# Patient Record
Sex: MALE | Race: WHITE | NOT HISPANIC OR LATINO | Employment: FULL TIME | URBAN - METROPOLITAN AREA
[De-identification: names, ages, dates, MRNs, and addresses within clinical notes are randomized per-mention and may not be internally consistent; named-entity substitution may affect disease eponyms.]

---

## 2017-05-31 ENCOUNTER — HOSPITAL ENCOUNTER (OUTPATIENT)
Dept: RADIOLOGY | Facility: CLINIC | Age: 62
Discharge: HOME | End: 2017-05-31
Payer: COMMERCIAL

## 2017-05-31 ENCOUNTER — ALLSCRIPTS OFFICE VISIT (OUTPATIENT)
Dept: OTHER | Facility: OTHER | Age: 62
End: 2017-05-31

## 2017-05-31 DIAGNOSIS — M25.569 PAIN IN KNEE: ICD-10-CM

## 2017-05-31 PROCEDURE — 73564 X-RAY EXAM KNEE 4 OR MORE: CPT

## 2017-10-08 ENCOUNTER — OFFICE VISIT (OUTPATIENT)
Dept: URGENT CARE | Facility: CLINIC | Age: 62
End: 2017-10-08
Payer: COMMERCIAL

## 2017-10-08 DIAGNOSIS — B37.42 CANDIDAL BALANITIS: ICD-10-CM

## 2017-10-08 PROCEDURE — 99203 OFFICE O/P NEW LOW 30 MIN: CPT

## 2017-10-09 ENCOUNTER — APPOINTMENT (OUTPATIENT)
Dept: LAB | Facility: HOSPITAL | Age: 62
End: 2017-10-09
Attending: FAMILY MEDICINE
Payer: COMMERCIAL

## 2017-10-09 DIAGNOSIS — B37.42 CANDIDAL BALANITIS: ICD-10-CM

## 2017-10-09 PROCEDURE — 87077 CULTURE AEROBIC IDENTIFY: CPT

## 2017-10-09 PROCEDURE — 87086 URINE CULTURE/COLONY COUNT: CPT

## 2017-10-09 PROCEDURE — 87147 CULTURE TYPE IMMUNOLOGIC: CPT

## 2017-10-09 NOTE — PROGRESS NOTES
Assessment  1  Candidal balanitis (112 2) (B37 42)   2  Microscopic hematuria (599 72) (R31 29)    Plan  Candidal balanitis    · Nystatin-Triamcinolone 085582-3 1 UNIT/GM-% External Cream; APPLY  SPARINGLY TO AFFECTED AREA(S) 3 TIMES A DAY   · (1) URINE CULTURE; Source:Urine, Clean Catch; Status:Active - Retrospective By  Protocol Authorization; Requested for:08Oct2017;    · Urine Dip Non-Automated- POC; Status:Resulted - Requires Verification,Retrospective  By Protocol Authorization;   Done: 17KLH9367 08:46AM  Microscopic hematuria    · Ciprofloxacin HCl - 500 MG Oral Tablet; TAKE 1 TABLET TWICE DAILY    Discussion/Summary  Discussion Summary:   F/U PCP 1-2 days  Medication Side Effects Reviewed: Possible side effects of new medications were reviewed with the patient/guardian today  Understands and agrees with treatment plan: The treatment plan was reviewed with the patient/guardian  The patient/guardian understands and agrees with the treatment plan      Chief Complaint  Chief Complaint Free Text Note Form: Wife states turtle who can't get it's head out of the shell for a week or two  Wife states urination issues for a few months  Shaft is swollen  The area is not itchy and minimum pain  Decreased appetite  Chills and fever within last 2 weeks  Achy  Did not take any medication last night or this morning  History of Present Illness  HPI: Pt presents with swelling around the shaft of the penis for 1-2 weeks  He denies dysuria, urgency or frequency  Temp today of 100 7, he has not taken his temp prior to today  Per his wife he has had shaking chills over the past 2 days, he feels fatigued  No abdominal or low back pain  No flank pain  Urinary stream seem slower and weaker, no hx of BPH  [facility] Based Practices Required Assessment:    Prefered Language is  english  Primary Language is  english  Review of Systems  Focused-Male:   Constitutional: as noted in HPI     Cardiovascular: no complaints of slow or fast heart rate, no chest pain, no palpitations, no leg claudication or lower extremity edema  Respiratory: no complaints of shortness of breath, no wheezing or cough, no dyspnea on exertion, no orthopnea or PND  Gastrointestinal: no complaints of abdominal pain, no constipation, no nausea or vomiting, no diarrhea or bloody stools  Genitourinary: as noted in HPI  Musculoskeletal: no complaints of arthralgia, no myalgia, no joint swelling or stiffness, no limb pain or swelling  Integumentary: no complaints of skin rash or lesion, no itching or dry skin, no skin wounds  Active Problems  1  Diabetes mellitus (250 00) (E11 9)   2  Hypertension (401 9) (I10)   3  Knee pain (719 46) (M25 569)   4  Patellar tendonitis of right knee (726 64) (M76 51)   5  Primary osteoarthritis of right knee (715 16) (M17 11)    Past Medical History  1  History of Atrial fibrillation (427 31) (I48 91)   2  History of gout (V12 29) (Z87 39)   3  History of renal calculi (V13 01) (Z87 442)   4  History of Reflux (530 81) (K21 9)    Family History  Mother    1  Family history of Colon cancer   2  Family history of Uterine cancer  Father    3  Family history of Prostate cancer  Brother    4  Family history of Lung cancer    Social History   · Former smoker (X87 53) (B92 511)   · Occasional alcohol use    Surgical History  1  History of Surgery Spermatic Cord For Varicocele    Current Meds   1  Allopurinol 100 MG Oral Tablet; TAKE 1 TABLET TWICE DAILY; Therapy: 58BSO5779 to Recorded   2  Avapro 300 MG Oral Tablet; TAKE 1 TABLET DAILY; Therapy: (Recorded:02Oct2014) to Recorded   3  B Complex TABS; Therapy: [phone]) to Recorded   4  Cinnamon TABS; Therapy: [phone]) to Recorded   5  Crestor 20 MG Oral Tablet; TAKE 1 TABLET DAILY; Therapy: 79LDQ9508 to Recorded   6  Daily Multivitamin TABS; Therapy: [phone]) to Recorded   7  Docusate Sodium 100 MG Oral Capsule;    Therapy: (SXCFQWYV:69AVO4233) to Recorded   8  Fish Oil 1200 MG Oral Capsule; Take 1 capsule twice daily; Therapy: (29-29-69-33) to Recorded   9  Gabapentin 300 MG Oral Capsule; TAKE 1 CAPSULE AT BEDTIME; Therapy: 25Jla2484 to Recorded   10  Glimepiride 2 MG Oral Tablet; Take 1 tablet twice daily; Therapy: 87MPO7212 to Recorded   11  Glucosamine Sulfate 1000 MG Oral Capsule; Therapy: (Recorded:02Oct2014) to Recorded   12  Januvia 100 MG Oral Tablet; TAKE 1 TABLET DAILY; Therapy: 92Ejq7029 to Recorded   13  Lasix 40 MG Oral Tablet; TAKE 1 TABLET DAILY; Therapy: 22Ifx3075 to Recorded   14  MetFORMIN HCl - 1000 MG Oral Tablet; TAKE 1 TABLET TWICE DAILY; Therapy: 06Bal4154 to Recorded   15  Niaspan 500 MG Oral Tablet Extended Release; TAKE 1 TABLET AT BEDTIME; Therapy: 69UHQ7753 to Recorded   16  Prevacid 30 MG Oral Capsule Delayed Release; TAKE 1 CAPSULE DAILY; Therapy: 64Pyn7458 to Recorded   17  Toprol  MG Oral Tablet Extended Release 24 Hour; Take 1 tablet twice daily; Therapy: 95DEG6115 to (Evaluate:05Oct2014) Recorded   18  Vitamin B12 TABS; Therapy: (29-29-69-33) to Recorded   19  Vitamin D3 2000 UNIT Oral Tablet; Take 1 tablet twice daily; Therapy: (Recorded:02Oct2014) to Recorded    Allergies  1  Contrast Media Ready-Box MISC    Vitals  Signs   Recorded: 26XFU4653 14:77CW   Systolic: 184  Diastolic: 75  Recorded: 43FJF1395 08:21AM   Temperature: 100 7 F  Heart Rate: 77  Respiration: 16  Height: 5 ft 10 in  Weight: 339 lb   BMI Calculated: 48 64  BSA Calculated: 2 61  O2 Saturation: 95    Physical Exam    Constitutional   General appearance: No acute distress, well appearing and well nourished  Abdomen   Abdomen: Non-tender, no masses  Liver and spleen: No hepatomegaly or splenomegaly  Additional Exam:  : glans erythematous, swelling of skin surrounding shaft of penis, malodorous when skin retracted past glans  circumcised        Results/Data  Urine Dip Non-Automated- POC 30HYH5353 08:46AM Sydnee Noun     Test Name Result Flag Reference   Color Yellow     Clarity Hazy     Leukocytes neg     Nitrite neg     Blood trace     Bilirubin neg     Urobilinogen 0 2     Protein 300+     Ph 5 0     Specific Gravity 1 025     Ketone trace     Glucose neg     Color Yellow     Clarity Hazy     Leukocytes neg     Nitrite neg     Blood trace     Bilirubin neg     Urobilinogen 0 2     Protein 300+     Ph 5 0     Specific Gravity 1 025     Ketone trace     Glucose neg               Message  Return to work or school:   Frida Sutton is under my professional care  He was seen in my office on 10/8/2017        Darwyn Mcardle, D O        Signatures   Electronically signed by : Darwyn Mcardle, DO; Oct  8 2017  8:55AM EST                       (Author)

## 2017-10-11 LAB — BACTERIA UR CULT: NORMAL

## 2018-01-12 VITALS
BODY MASS INDEX: 45.1 KG/M2 | SYSTOLIC BLOOD PRESSURE: 151 MMHG | HEIGHT: 70 IN | WEIGHT: 315 LBS | DIASTOLIC BLOOD PRESSURE: 78 MMHG | HEART RATE: 69 BPM

## 2018-01-18 NOTE — MISCELLANEOUS
Message  Return to work or school:   Tara Cabrera is under my professional care  He was seen in my office on 10/8/2017        SHIRLEY Green        Signatures   Electronically signed by : Leanne Angeles DO; Oct  8 2017  8:55AM EST                       (Author)

## 2018-03-09 ENCOUNTER — TRANSCRIBE ORDERS (OUTPATIENT)
Dept: ADMINISTRATIVE | Facility: HOSPITAL | Age: 63
End: 2018-03-09

## 2018-03-09 DIAGNOSIS — M75.102 ROTATOR CUFF SYNDROME OF LEFT SHOULDER: Primary | ICD-10-CM

## 2018-03-16 ENCOUNTER — HOSPITAL ENCOUNTER (OUTPATIENT)
Dept: MRI IMAGING | Facility: HOSPITAL | Age: 63
Discharge: HOME | End: 2018-03-16
Payer: COMMERCIAL

## 2018-03-16 ENCOUNTER — HOSPITAL ENCOUNTER (OUTPATIENT)
Dept: RADIOLOGY | Facility: HOSPITAL | Age: 63
Discharge: HOME | End: 2018-03-16
Payer: COMMERCIAL

## 2018-03-16 DIAGNOSIS — M75.102 ROTATOR CUFF SYNDROME OF LEFT SHOULDER: ICD-10-CM

## 2018-03-16 PROCEDURE — 73221 MRI JOINT UPR EXTREM W/O DYE: CPT

## 2018-03-16 PROCEDURE — 73030 X-RAY EXAM OF SHOULDER: CPT

## 2018-03-29 ENCOUNTER — OFFICE VISIT (OUTPATIENT)
Dept: OBGYN CLINIC | Facility: CLINIC | Age: 63
End: 2018-03-29
Payer: COMMERCIAL

## 2018-03-29 VITALS — HEART RATE: 87 BPM | HEIGHT: 69 IN | DIASTOLIC BLOOD PRESSURE: 75 MMHG | SYSTOLIC BLOOD PRESSURE: 130 MMHG

## 2018-03-29 DIAGNOSIS — M67.912 TENDINOPATHY OF ROTATOR CUFF, LEFT: Primary | ICD-10-CM

## 2018-03-29 PROCEDURE — 99213 OFFICE O/P EST LOW 20 MIN: CPT | Performed by: ORTHOPAEDIC SURGERY

## 2018-03-29 RX ORDER — IRBESARTAN 300 MG/1
1 TABLET ORAL DAILY
COMMUNITY
End: 2020-11-02

## 2018-03-29 RX ORDER — GABAPENTIN 300 MG/1
CAPSULE ORAL
COMMUNITY
Start: 2018-02-19 | End: 2019-02-18 | Stop reason: SDUPTHER

## 2018-03-29 RX ORDER — CHLORAL HYDRATE 500 MG
1 CAPSULE ORAL 2 TIMES DAILY
COMMUNITY
End: 2020-11-01

## 2018-03-29 RX ORDER — UBIDECARENONE 75 MG
CAPSULE ORAL
COMMUNITY

## 2018-03-29 RX ORDER — HYDROCHLOROTHIAZIDE 25 MG/1
TABLET ORAL
COMMUNITY
Start: 2018-02-23 | End: 2021-08-11 | Stop reason: ALTCHOICE

## 2018-03-29 RX ORDER — METOPROLOL SUCCINATE 100 MG/1
1 TABLET, EXTENDED RELEASE ORAL 2 TIMES DAILY
COMMUNITY
Start: 2014-01-03 | End: 2018-11-26 | Stop reason: SDUPTHER

## 2018-03-29 RX ORDER — ROSUVASTATIN CALCIUM 20 MG/1
1 TABLET, COATED ORAL DAILY
COMMUNITY
Start: 2014-05-19 | End: 2019-02-21 | Stop reason: SDUPTHER

## 2018-03-29 RX ORDER — GLIMEPIRIDE 4 MG/1
TABLET ORAL
COMMUNITY
Start: 2018-02-19 | End: 2019-02-18 | Stop reason: SDUPTHER

## 2018-03-29 RX ORDER — POTASSIUM CHLORIDE 20 MEQ/1
TABLET, EXTENDED RELEASE ORAL
COMMUNITY
Start: 2018-02-19 | End: 2018-11-26 | Stop reason: SDUPTHER

## 2018-03-29 RX ORDER — ALLOPURINOL 100 MG/1
TABLET ORAL
COMMUNITY
Start: 2018-02-19 | End: 2018-11-26 | Stop reason: SDUPTHER

## 2018-03-29 RX ORDER — FUROSEMIDE 40 MG/1
1 TABLET ORAL DAILY
COMMUNITY
Start: 2013-09-03 | End: 2018-10-08 | Stop reason: ALTCHOICE

## 2018-03-29 NOTE — ASSESSMENT & PLAN NOTE
58-year-old male with left shoulder rotator cuff tendinopathy  I recommended physical therapy for him  He will follow up with me after 6-8 weeks of physical therapy if he is failing to have significant improvement

## 2018-03-29 NOTE — PROGRESS NOTES
Assessment:     1  Tendinopathy of rotator cuff, left        Plan:     Problem List Items Addressed This Visit        Musculoskeletal and Integument    Tendinopathy of rotator cuff, left - Primary     65-year-old male with left shoulder rotator cuff tendinopathy  I recommended physical therapy for him  He will follow up with me after 6-8 weeks of physical therapy if he is failing to have significant improvement  Relevant Orders    Ambulatory referral to Physical Therapy         Subjective:     Patient ID: Miky Carreno is a 60 y o  male  Chief Complaint:  65-year-old male with a several year history of problems with his left shoulder  Back in July he had a cortisone injection in because it was bothering him  This gave him some short-term relief  At that time he was given a home exercise program which he did not really do  He has not done any physical therapy  The pain is slowly worsening  The shoulder hurts the most if he abducts it  He has pain in the anterior aspect of the shoulder primarily  He does have some difficulty sleeping, but because he sleeps in a recliner with a CPAP he does not have severe issues with sleeping  He is wanting to get the shoulder pain taking care of  Allergy:  Allergies   Allergen Reactions    Iodine Other (See Comments)     "KIDNEY DYE"     Medications:  all current active meds have been reviewed  Past Medical History:  Past Medical History:   Diagnosis Date    Diabetes insipidus ([address] )     High cholesterol     Hypertension      Past Surgical History:  History reviewed  No pertinent surgical history  Family History:  Family History   Problem Relation Age of Onset    No Known Problems Mother     No Known Problems Father      Social History:  History   Alcohol Use No     History   Drug Use No     History   Smoking Status    Never Smoker   Smokeless Tobacco    Never Used     Review of Systems   Constitutional: Negative  HENT: Negative      Eyes: Negative  Respiratory: Negative  Cardiovascular: Positive for palpitations and leg swelling  Gastrointestinal: Negative  Endocrine: Negative  Genitourinary: Negative  Musculoskeletal: Positive for arthralgias, joint swelling and myalgias  Skin: Negative  Allergic/Immunologic: Negative  Neurological: Negative  Hematological: Negative  Psychiatric/Behavioral: Negative  Objective:  BP Readings from Last 1 Encounters:   03/29/18 130/75      Wt Readings from Last 1 Encounters:   05/31/17 (!) 164 kg (361 lb 0 9 oz)      BMI:   Estimated body mass index is 51 81 kg/m² as calculated from the following:    Height as of 5/31/17: 5' 10" (1 778 m)  Weight as of 5/31/17: 164 kg (361 lb 0 9 oz)  BSA:   Estimated body surface area is 2 68 meters squared as calculated from the following:    Height as of 5/31/17: 5' 10" (1 778 m)  Weight as of 5/31/17: 164 kg (361 lb 0 9 oz)  Physical Exam   Constitutional: He is oriented to person, place, and time  He appears well-developed and well-nourished  No distress  HENT:   Head: Normocephalic and atraumatic  Eyes: Right eye exhibits no discharge  Left eye exhibits no discharge  Neck: Normal range of motion  Neck supple  No tracheal deviation present  Cardiovascular: Normal rate and regular rhythm  Pulmonary/Chest: Effort normal  No respiratory distress  Abdominal: Soft  He exhibits no distension  There is no tenderness  Neurological: He is alert and oriented to person, place, and time  Skin: Skin is warm  He is not diaphoretic  No erythema  Psychiatric: He has a normal mood and affect  His behavior is normal      Right Shoulder Exam     Tenderness   The patient is experiencing no tenderness  Range of Motion   The patient has normal right shoulder ROM  Muscle Strength   The patient has normal right shoulder strength      Tests   Apprehension: negative  Cross Arm: negative  Hawkin's test: negative  Impingement: negative    Other   Erythema: absent  Sensation: normal  Pulse: present      Left Shoulder Exam     Tenderness   Left shoulder tenderness location: Tender to palpation over the anterior bursa and anterior joint capsule  Range of Motion   The patient has normal left shoulder ROM  Muscle Strength   The patient has normal left shoulder strength  Tests   Apprehension: negative  Cross Arm: negative  Drop Arm: negative  Hawkin's test: positive  Impingement: positive    Other   Erythema: absent  Sensation: normal  Pulse: present     Comments:  Negative speed's, negative Houston's, pain at extremes of motion, pain with range of motion            I have personally reviewed pertinent films in PACS and my interpretation is Very minimal calcific tendinitis at the insertion of the supraspinatus on the greater tuberosity  MRI shows some near full-thickness rotator cuff tearing, but no discrete tears  No retraction  No muscle atrophy  No evidence of labral tears  María Negron

## 2018-04-03 ENCOUNTER — EVALUATION (OUTPATIENT)
Dept: PHYSICAL THERAPY | Facility: CLINIC | Age: 63
End: 2018-04-03
Payer: COMMERCIAL

## 2018-04-03 DIAGNOSIS — M67.912 TENDINOPATHY OF ROTATOR CUFF, LEFT: ICD-10-CM

## 2018-04-03 PROCEDURE — G8984 CARRY CURRENT STATUS: HCPCS | Performed by: PHYSICAL THERAPIST

## 2018-04-03 PROCEDURE — 97110 THERAPEUTIC EXERCISES: CPT | Performed by: PHYSICAL THERAPIST

## 2018-04-03 PROCEDURE — G8985 CARRY GOAL STATUS: HCPCS | Performed by: PHYSICAL THERAPIST

## 2018-04-03 PROCEDURE — 97162 PT EVAL MOD COMPLEX 30 MIN: CPT | Performed by: PHYSICAL THERAPIST

## 2018-04-03 NOTE — PROGRESS NOTES
PT Evaluation     Today's date: 4/3/2018  Patient name: Bryan Hylton  : 1955  MRN: [id number]  Referring provider: Reilly Hanley MD  Dx:   Encounter Diagnosis     ICD-10-CM    1  Tendinopathy of rotator cuff, left M67 912 Ambulatory referral to Physical Therapy                  Assessment  Impairments: abnormal gait, abnormal or restricted ROM, activity intolerance, impaired balance, impaired physical strength, lacks appropriate home exercise program and pain with function    Assessment details: Bryan Hylton is a 58 y o  male presenting to outpatient physical therapy at [place] [address] with complaints of L shoulder pain  He presents with decreased L shoulder range of motion, decreased L RTC strength, limited flexibility, poor postural awareness, decreased tolerance to activity and decreased functional mobility due to tendinopathy of rotator cuff, left and impingement  He would benefit from skilled PT services in order to address these deficits and reach maximum level of function  Thank you for the referral!  Barriers to therapy: None  Understanding of Dx/Px/POC: excellent  Goals  ST  Independent with HEP in 2 weeks  2  Increase AROM L shoulder to WNL all motions in 3 weeks  3  Good postural awareness in 3 weeks     LT  Achieve FOTO score of 62/100 in 6 weeks   2  Able to lift up to 50# and RTW full duty in 8 weeks  3  Strength = 5/5 traps and L shoulder abd in 8 weeks  4    No L pec minor tightness in 6 weeks     Plan  Patient would benefit from: skilled PT  Planned modality interventions: cryotherapy and TENS  Planned therapy interventions: flexibility, functional ROM exercises, home exercise program, joint mobilization, manual therapy, neuromuscular re-education, postural training, strengthening, stretching, therapeutic activities and therapeutic exercise  Frequency: 2x week  Duration in weeks: 8  Treatment plan discussed with: patient        Subjective Evaluation    History of Present Illness  Date of onset: 2017  Mechanism of injury: Pt presents to PT with c/o L shoulder pain since about 1 year ago after moving a heavy item and felt a sharp pain that lasted for a few weeks  Had injections and pain was much less until about 2 months ago  No injections since  Works in [employer] since 3/26/18 due to LBP, not L shoudler pain  Planning to RTW in the next few days  Recurrent probem    Quality of life: good    Pain  Current pain ratin  At best pain ratin  At worst pain ratin  Quality: sharp    Social Support  Lives with: spouse    Employment status: working  Hand dominance: right      Diagnostic Tests  MRI studies: abnormal (SS, IS and biceps tendinopathy, subacromial spur of subscap  Possible small full thickness tear of SS )  Patient Goals  Patient goals for therapy: decreased pain, increased motion, increased strength, independence with ADLs/IADLs and return to sport/leisure activities          Objective     Postural Observations  Seated posture: poor  Standing posture: poor  Correction of posture: makes symptoms better        Palpation   Left   Tenderness of the biceps  Left Shoulder Comments  Left biceps: at shoulder  Tenderness     Left Shoulder   Tenderness in the [facility] joint, biceps tendon (proximal) and supraspinatus tendon  Additional Tenderness Details  Mod tightness L pec minor      Neurological Testing     Sensation     Shoulder   Left Shoulder   Intact: light touch    Reflexes   Left   Biceps (C5/C6): absent (0)  Brachioradialis (C6): absent (0)    Active Range of Motion   Left Shoulder   Flexion: 172 degrees   Extension: 32 degrees   Abduction: 143 degrees with pain  External rotation 90°: WFL  Internal rotation 90°: 71 degrees with pain    Right Shoulder   Normal active range of motion    Passive Range of Motion   Left Shoulder   Flexion: WFL and with pain  Extension: WFL  Abduction: WFL and with pain  External rotation 90°: [facility]  Internal rotation 90°: WFL and with pain    Joint Play   Left Shoulder  Joints within functional limits are the anterior capsule, posterior capsule and inferior capsule  Strength/Myotome Testing     Left Shoulder     Planes of Motion   Flexion: 5   Extension: 5   Abduction: 4   Adduction: 5   External rotation at 0°: 5   Internal rotation at 0°: 5     Isolated Muscles   Infraspinatus: 5   Middle trapezius: 4   Supraspinatus: 4-     Right Shoulder   Normal muscle strength    Tests     Left Shoulder   Positive empty can and Hawkin's  Negative apprehension and Neer's  Daily Treatment Diary     Dx:    1   Tendinopathy of rotator cuff, left      POC EXPIRES On:  5/29/18  PRECAUTIONS:  Chronic LBP  CO-MORBIDITES:  A-fib, diabetes, HTN, high cholesterol, sleep apnea, gout, obesity  PERSONAL FACTORS:  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

## 2018-04-05 ENCOUNTER — OFFICE VISIT (OUTPATIENT)
Dept: PHYSICAL THERAPY | Facility: CLINIC | Age: 63
End: 2018-04-05
Payer: COMMERCIAL

## 2018-04-05 DIAGNOSIS — M67.912 TENDINOPATHY OF ROTATOR CUFF, LEFT: Primary | ICD-10-CM

## 2018-04-05 PROCEDURE — 97112 NEUROMUSCULAR REEDUCATION: CPT | Performed by: PHYSICAL THERAPIST

## 2018-04-05 PROCEDURE — 97140 MANUAL THERAPY 1/> REGIONS: CPT | Performed by: PHYSICAL THERAPIST

## 2018-04-05 NOTE — PROGRESS NOTES
Daily Note     Today's date: 2018  Patient name: Philomena Olson  : 1955  MRN: [id number]  Referring provider: Radha Gold MD  Dx:   Encounter Diagnosis     ICD-10-CM    1  Tendinopathy of rotator cuff, left M67 912                   Subjective: Pt reports feeling better since IE  Really thinking about his posture  Objective: See treatment diary below      Assessment: Pt presents to outpatient physical therapy at [place] [address] with complaints of L shoulder pain  He presents with decreased L shoulder range of motion, decreased L RTC strength, limited flexibility, poor postural awareness, decreased tolerance to activity and decreased functional mobility due to tendinopathy of rotator cuff, left and impingement  He will continue to benefit from skilled PT services in order to address these deficits and reach maximum level of function  Much better postural awareness with less pain today  Still some weakness in L RTC  Plan: Continue to increase RTC strength and postural strength       POC EXPIRES On:  18  PRECAUTIONS:  Chronic LBP  CO-MORBIDITES:  A-fib, diabetes, HTN, high cholesterol, sleep apnea, gout, obesity  PERSONAL FACTORS:  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#    Manual  4/3 4/5           L GH jt mobs - grade 3  NV 8'           PROM L shoulder all motions in supine  2'                                                      Exercise Diary  4/3 4/5           T-band rows B Pl[place] 20 Plum 30           Bent over rows L  5# 20 5# 30           Corner pec stretch  15" 5 15" 5           Retro UBE NV L6 5'           T-band L shoulder ER NV Blue 20           T-band LPD B  Blue 20           Standing scaptions B  NV                                                                                                                                                                                        Modalities

## 2018-04-10 ENCOUNTER — OFFICE VISIT (OUTPATIENT)
Dept: PHYSICAL THERAPY | Facility: CLINIC | Age: 63
End: 2018-04-10
Payer: COMMERCIAL

## 2018-04-10 DIAGNOSIS — M67.912 TENDINOPATHY OF ROTATOR CUFF, LEFT: Primary | ICD-10-CM

## 2018-04-10 PROCEDURE — 97110 THERAPEUTIC EXERCISES: CPT | Performed by: PHYSICAL THERAPIST

## 2018-04-10 PROCEDURE — 97112 NEUROMUSCULAR REEDUCATION: CPT | Performed by: PHYSICAL THERAPIST

## 2018-04-10 PROCEDURE — 97140 MANUAL THERAPY 1/> REGIONS: CPT | Performed by: PHYSICAL THERAPIST

## 2018-04-10 NOTE — PROGRESS NOTES
Daily Note     Today's date: 4/10/2018  Patient name: Dipika Oreilly  : 1955  MRN: [id number]  Referring provider: Destiny Browne MD  Dx:   Encounter Diagnosis     ICD-10-CM    1  Tendinopathy of rotator cuff, left M67 912                 Subjective: Pt reports feeling much better since IE   L shoulder doesn't hurt anymore, but LBP is bad  Has been OOW x 2 weeks due to LBP  Objective: See treatment diary below      Assessment: Pt presents to outpatient physical therapy at [place] [address] with complaints of LBP > L shoulder pain now  L shoulder has responded well to PT, but is still weak in RTC muscles  He will continue to benefit from skilled PT services in order to address these deficits and reach maximum level of function  Much better postural awareness with less pain today  Min fatigue with scaptions today  Plan: Continue to increase RTC strength and postural strength  Consider DA PT for LBP       POC EXPIRES On:  18  PRECAUTIONS:  Chronic LBP  CO-MORBIDITES:  A-fib, diabetes, HTN, high cholesterol, sleep apnea, gout, obesity  PERSONAL FACTORS:  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#    Manual  4/3 4/5 4/10          L GH jt mobs - grade 3  NV 8' 7'          PROM L shoulder all motions in supine  2' 3'                                                     Exercise Diary  4/3 4/5 4/10          T-band rows B Plum 20 Plum 30 Plum 30          Bent over rows L  5# 20 5# 30 6# 30          Corner pec stretch  15" 5 15" 5 15" 5          Retro UBE NV L6 5' L6 6'          T-band L shoulder ER NV Blue 20 Blue 20          T-band LPD B  Blue 20 Blue 30          Standing scaptions B  NV 4# 2x10                                                                                                                                                                                       Modalities

## 2018-04-12 ENCOUNTER — OFFICE VISIT (OUTPATIENT)
Dept: PHYSICAL THERAPY | Facility: CLINIC | Age: 63
End: 2018-04-12
Payer: COMMERCIAL

## 2018-04-12 DIAGNOSIS — M67.912 TENDINOPATHY OF ROTATOR CUFF, LEFT: Primary | ICD-10-CM

## 2018-04-12 PROCEDURE — 97110 THERAPEUTIC EXERCISES: CPT | Performed by: PHYSICAL THERAPIST

## 2018-04-12 PROCEDURE — 97140 MANUAL THERAPY 1/> REGIONS: CPT | Performed by: PHYSICAL THERAPIST

## 2018-04-12 PROCEDURE — 97112 NEUROMUSCULAR REEDUCATION: CPT | Performed by: PHYSICAL THERAPIST

## 2018-04-12 NOTE — PROGRESS NOTES
Daily Note     Today's date: 2018  Patient name: Ganga Soto  : 1955  MRN: [id number]  Referring provider: Terri Hall MD  Dx:   Encounter Diagnosis     ICD-10-CM    1  Tendinopathy of rotator cuff, left M67 912               Subjective: Pt reports feeling much better this week  L shoulder doesn't hurt anymore unless lifting very heavy items  LBP is slightly less toobad  Has been OOW x 2 weeks due to LBP  Objective: See treatment diary below      Assessment: Pt presents to outpatient physical therapy at [facility] with complaints of LBP > L shoulder pain now  L shoulder has responded well to PT, but is still slightly weak in RTC muscles  He will continue to benefit from skilled PT services in order to address these deficits and reach maximum level of function  Much better postural awareness with less pain this week  Less fatigue with scaptions today  Plan: Continue to increase RTC strength and postural strength  Add MREs L shoulder ER/IR, PNF D2 flex/ext, body blade  Consider DA PT for LBP       POC EXPIRES On:  18  PRECAUTIONS:  Chronic LBP  CO-MORBIDITES:  A-fib, diabetes, HTN, high cholesterol, sleep apnea, gout, obesity  PERSONAL FACTORS:  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#    Manual  4/3 4/5 4/10 4/12         L GH jt mobs - grade 3  NV 8' 7' 7'         PROM L shoulder all motions in supine  2' 3' 3'         MREs L shoulder ER/IR    NV         MREs PNF D2 flex/ext    NV                          Exercise Diary  4/3 4/5 4/10 4/12         T-band rows B Plum 20 Plum 30 Plum 30 Plum 30         Bent over rows L  5# 20 5# 30 6# 30 6# 30         Corner pec stretch  15" 5 15" 5 15" 5 15" 5         Retro UBE NV L6 5' L6 6' L6 6'         T-band L shoulder ER/IR NV Blue 20 Blue 20 Blue 20 ea         T-band LPD B  Blue 20 Blue 30 Blue 30         Standing scaptions B  NV 4# 2x10 4# 2x10         Body blade L shoulder ER/IR, 90 degrees flex and abd    NV Modalities

## 2018-04-17 ENCOUNTER — HOSPITAL ENCOUNTER (OUTPATIENT)
Dept: RADIOLOGY | Facility: HOSPITAL | Age: 63
Discharge: HOME | End: 2018-04-17
Payer: COMMERCIAL

## 2018-04-17 ENCOUNTER — TRANSCRIBE ORDERS (OUTPATIENT)
Dept: ADMINISTRATIVE | Facility: HOSPITAL | Age: 63
End: 2018-04-17

## 2018-04-17 ENCOUNTER — OFFICE VISIT (OUTPATIENT)
Dept: PHYSICAL THERAPY | Facility: CLINIC | Age: 63
End: 2018-04-17
Payer: COMMERCIAL

## 2018-04-17 DIAGNOSIS — M51.06 INTERVERTEBRAL LUMBAR DISC DISORDER WITH MYELOPATHY, LUMBAR REGION: Primary | ICD-10-CM

## 2018-04-17 DIAGNOSIS — M51.06 INTERVERTEBRAL LUMBAR DISC DISORDER WITH MYELOPATHY, LUMBAR REGION: ICD-10-CM

## 2018-04-17 DIAGNOSIS — M67.912 TENDINOPATHY OF ROTATOR CUFF, LEFT: Primary | ICD-10-CM

## 2018-04-17 PROCEDURE — 97140 MANUAL THERAPY 1/> REGIONS: CPT | Performed by: PHYSICAL THERAPIST

## 2018-04-17 PROCEDURE — 97112 NEUROMUSCULAR REEDUCATION: CPT | Performed by: PHYSICAL THERAPIST

## 2018-04-17 PROCEDURE — 72110 X-RAY EXAM L-2 SPINE 4/>VWS: CPT

## 2018-04-17 NOTE — PROGRESS NOTES
Daily Note     Today's date: 2018  Patient name: Re Roach  : 1955  MRN: [id number]  Referring provider: Sunita Cramer MD  Dx:   Encounter Diagnosis     ICD-10-CM    1  Tendinopathy of rotator cuff, left M67 912                  Subjective: Pt reports feeling much better recently aside form some L UT tightness  LBP is much less today too  Has been OOW x 3 weeks due to LBP  Objective: See treatment diary below      Assessment: Pt presents to outpatient physical therapy at [place] [address] with complaints of LBP > L shoulder pain now  L shoulder has responded well to PT, but is still slightly weak in RTC muscles in abduction  He will continue to benefit from skilled PT services in order to address these deficits and reach maximum level of function  Much better postural awareness with less pain this week  Less fatigue with scaptions today, but still some pain  L GH jt mobility is excellent now  Plan: Continue to increase RTC strength and postural strength  Add MREs L shoulder ER/IR, PNF D2 flex/ext  Consider DA PT for LBP if needed       POC EXPIRES On:  18  PRECAUTIONS:  Chronic LBP  CO-MORBIDITES:  A-fib, diabetes, HTN, high cholesterol, sleep apnea, gout, obesity  PERSONAL FACTORS:  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#    Manual  4/3 4/5 4/10 4/12 4/17        L GH jt mobs - grade 3  NV 8' 7' 7' 7'        PROM L shoulder all motions in supine  2' 3' 3' 3'        MREs L shoulder ER/IR    NV NV        MREs PNF D2 flex/ext    NV NV                         Exercise Diary  4/3 4/5 4/10 4/12 4/17        T-band rows B Plum 20 Plum 30 Plum 30 Plum 30 Plum 30        Bent over rows L  5# 20 5# 30 6# 30 6# 30 6# 30        Corner pec stretch  15" 5 15" 5 15" 5 15" 5 15" 5        Retro UBE NV L6 5' L6 6' L6 6' L6 6'        T-band L shoulder ER/IR NV Blue 20 Blue 20 Blue 20 ea Blue 20 ea        T-band LPD B  Blue 20 Blue 30 Blue 30 Blue 30        Standing scaptions B  NV 4# 2x10 4# 2x10 4# 2x10        Body blade L shoulder ER/IR, 90 degrees flex and abd    NV 15" ea                                                                                                                                                                        Modalities

## 2018-04-19 ENCOUNTER — OFFICE VISIT (OUTPATIENT)
Dept: PHYSICAL THERAPY | Facility: CLINIC | Age: 63
End: 2018-04-19
Payer: COMMERCIAL

## 2018-04-19 DIAGNOSIS — M67.912 TENDINOPATHY OF ROTATOR CUFF, LEFT: ICD-10-CM

## 2018-04-19 PROCEDURE — 97112 NEUROMUSCULAR REEDUCATION: CPT | Performed by: PHYSICAL THERAPIST

## 2018-04-19 PROCEDURE — G8985 CARRY GOAL STATUS: HCPCS | Performed by: PHYSICAL THERAPIST

## 2018-04-19 PROCEDURE — G8984 CARRY CURRENT STATUS: HCPCS | Performed by: PHYSICAL THERAPIST

## 2018-04-19 PROCEDURE — 97140 MANUAL THERAPY 1/> REGIONS: CPT | Performed by: PHYSICAL THERAPIST

## 2018-04-19 NOTE — PROGRESS NOTES
Daily Note     Today's date: 2018  Patient name: Gilbert Aguirre  : 1955  MRN: [id number]  Referring provider: Kishan Cameron MD  Dx: No diagnosis found  Subjective: Pt reports feeling better most times  Some L UT tightness and lateral shoulder pain  LBP is still present and has a script for PT for that too now  Has been OOW x 3 weeks due to LBP  Objective: See treatment diary below      Assessment: Pt presents to outpatient physical therapy at [place] [address] with complaints of LBP > L shoulder pain now  L shoulder has responded well to PT, but is still slightly weak in RTC muscles in abduction with some pain  He will continue to benefit from skilled PT services in order to address these deficits and reach maximum level of function  Much better postural awareness with less pain this week  L GH jt mobility is excellent now, but still some instability that can be improved with stability exercises  FOTO scores are progressing well  Plan: Continue to increase RTC strength and postural strength  Add supine medicine ball protraction  Add Eval for M51 06 next session       POC EXPIRES On:  18  PRECAUTIONS:  Chronic LBP  CO-MORBIDITES:  A-fib, diabetes, HTN, high cholesterol, sleep apnea, gout, obesity  PERSONAL FACTORS:  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#    Manual  4/3 4/5 4/10 4/12 4/17 4/19       L GH jt mobs - grade 3  NV 8' 7' 7' 7' 7'       PROM L shoulder all motions in supine  2' 3' 3' 3' 3'       MREs L shoulder ER/IR    NV NV 10       MREs PNF D2 flex/ext    NV NV 10                        Exercise Diary  4/3 4/5 4/10 4/12 4/17 4/19       T-band rows B Plum 20 Plum 30 Plum 30 Plum 30 Plum 30 Plum 30       Bent over rows L  5# 20 5# 30 6# 30 6# 30 6# 30 6# 30       Corner pec stretch  15" 5 15" 5 15" 5 15" 5 15" 5 15" 5       Retro UBE NV L6 5' L6 6' L6 6' L6 6' L6 6'       T-band L shoulder ER/IR NV Blue 20 Blue 20 Blue 20 ea Blue 20 ea Blue 20 ea T-band LPD B  Blue 20 Blue 30 Blue 30 Blue 30 Blue 30       Standing scaptions B  NV 4# 2x10 4# 2x10 4# 2x10 3# 2x10       Body blade L shoulder ER/IR, 90 degrees flex and abd    NV 15" ea 15" ea       Supine medicine ball protraction      NV                                                                                                                                                          Modalities

## 2018-04-23 ENCOUNTER — EVALUATION (OUTPATIENT)
Dept: PHYSICAL THERAPY | Facility: CLINIC | Age: 63
End: 2018-04-23
Payer: COMMERCIAL

## 2018-04-23 ENCOUNTER — TRANSCRIBE ORDERS (OUTPATIENT)
Dept: PHYSICAL THERAPY | Facility: CLINIC | Age: 63
End: 2018-04-23

## 2018-04-23 DIAGNOSIS — M67.912 TENDINOPATHY OF ROTATOR CUFF, LEFT: Primary | ICD-10-CM

## 2018-04-23 DIAGNOSIS — M51.06 INTERVERTEBRAL LUMBAR DISC DISORDER WITH MYELOPATHY, LUMBAR REGION: Primary | ICD-10-CM

## 2018-04-23 DIAGNOSIS — M51.06 INTERVERTEBRAL LUMBAR DISC DISORDER WITH MYELOPATHY, LUMBAR REGION: ICD-10-CM

## 2018-04-23 PROCEDURE — 97140 MANUAL THERAPY 1/> REGIONS: CPT | Performed by: PHYSICAL THERAPIST

## 2018-04-23 PROCEDURE — 97164 PT RE-EVAL EST PLAN CARE: CPT | Performed by: PHYSICAL THERAPIST

## 2018-04-23 PROCEDURE — 97112 NEUROMUSCULAR REEDUCATION: CPT | Performed by: PHYSICAL THERAPIST

## 2018-04-23 NOTE — LETTER
2018    Germain Bills, [place] [address]    Patient: Mina Salcido   YOB: 1955   Date of Visit: 2018     Encounter Diagnosis     ICD-10-CM    1  Tendinopathy of rotator cuff, left M67 912    2  Intervertebral lumbar disc disorder with myelopathy, lumbar region M51 06        Dear Dr Giovany Flannery:    Please review the attached Plan of Care from [facility] recent visit  Please verify that you agree therapy should continue by signing the attached document and sending it back to our office  If you have any questions or concerns, please don't hesitate to call  Sincerely,    Obdulio Michael PT      Referring Provider:      I certify that I have read the below Plan of Care and certify the need for these services furnished under this plan of treatment while under my care  Germain Bills MD  [address] [place] [address] 109: [phone]          PT Re-Evaluation     Today's date: 2018  Patient name: Mina Salcido  : 1955  MRN: [id number]  Referring provider: Brooklynn Narvaez MD  Dx:   Encounter Diagnosis     ICD-10-CM    1  Tendinopathy of rotator cuff, left M67 912    2  Intervertebral lumbar disc disorder with myelopathy, lumbar region M51 06                   Assessment  Impairments: abnormal or restricted ROM, activity intolerance, impaired balance, impaired physical strength, lacks appropriate home exercise program and pain with function    Assessment details: Mina Salcido is a 58 y o  male presenting to outpatient physical therapy at [place] [address] with complaints of LBP  He presents with decreased lumbar extension range of motion, decreased core strength, limited flexibility, poor postural awareness, poor body mechanics, decreased tolerance to activity and decreased functional mobility due to intervertebral lumbar disc disorder with myelopathy, lumbar region     He would benefit from skilled PT services in order to address these deficits and reach maximum level of function  Thank you for the referral!  Pt is progressing well with L shoulder pain aside from with abduction due to weakness and slight pain  Barriers to therapy: None  Understanding of Dx/Px/POC: excellent   Prognosis: good    Goals  ST  Independent with HEP in 2 weeks  2  Increase AROM L-spine to WNL all motions in 3 weeks  3  Good body mechanics in 2 weeks     LT  Achieve FOTO score of 67/100 in 6 weeks   2  Able to RTW full duty without LBP in 6 weeks  3  Strength abdominals = 4/5 in 6 weeks  4  No tightness or tenderness LB in 6 weeks    Plan  Patient would benefit from: skilled PT  Planned modality interventions: cryotherapy and TENS  Planned therapy interventions: abdominal trunk stabilization, ADL retraining, body mechanics training, flexibility, functional ROM exercises, home exercise program, joint mobilization, manual therapy, neuromuscular re-education, postural training, strengthening, stretching, therapeutic activities and therapeutic exercise  Frequency: 2x week  Duration in weeks: 6  Treatment plan discussed with: patient        Subjective Evaluation    History of Present Illness  Mechanism of injury: Pt reports that L shoulder is feeling better most times  Some L UT tightness and lateral shoulder pain  LBP is limiting him more  Has had LBP for many years, but more since 3/26/18 when getting out of bed  Has been OOW x 3 weeks due to LBP  His job requires him to bend down to very low levels frequently and lift heavy loads     Recurrent probem    Quality of life: good    Pain  Current pain ratin  At best pain ratin  At worst pain ratin  Quality: sharp and dull ache  Aggravating factors: lifting    Social Support    Employment status: not working    Diagnostic Tests  X-ray: abnormal (mild L2-3 R lumbar scolisis and DDD L5-S1  )  Patient Goals  Patient goals for therapy: increased strength, independence with ADLs/IADLs, return to sport/leisure activities, return to work, increased motion and decreased pain          Objective     Palpation   Left   Tenderness of the erector spinae  Right Tenderness of the erector spinae  Neurological Testing     Sensation     Lumbar   Left   Intact: light touch    Right   Intact: light touch    Active Range of Motion     Lumbar   Flexion: WFL  Extension: 50 degrees with pain  Left lateral flexion: WFL  Right lateral flexion: WFL  Left rotation: WF  Right rotation: [facility]    Additional Active Range of Motion Details  AROM listed is in percent available  Strength/Myotome Testing     Lumbar   Left   Normal strength    Right   Normal strength    Additional Strength Details  Abdominals = 3+/5  Tests       Thoracic   Negative slump  Lumbar   Positive repeated extension  Negative repeated flexion and slump  Left   Negative passive SLR and vertical compression  Right   Negative passive SLR and vertical compression  Additional Tests Details  FOTO = 57/100 for LBP           POC EXPIRES On:  5/29/18  PRECAUTIONS:  Chronic LBP  CO-MORBIDITES:  A-fib, diabetes, HTN, high cholesterol, sleep apnea, gout, obesity  PERSONAL FACTORS:  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#     Manual  4/3 4/5 4/10 4/12 4/17 4/19  4/23         L GH jt mobs - grade 3  NV 8' 7' 7' 7' 7'  7'         PROM L shoulder all motions in supine   2' 3' 3' 3' 3'  3'         MREs L shoulder ER/IR       NV NV 10  15         MREs PNF D2 flex/ext       NV NV 10  10                                       Exercise Diary  4/3 4/5 4/10 4/12 4/17 4/19  4/23         T-band rows B Plum 20 Plum 30 Plum 30 Plum 30 Plum 30 Plum 30  plum 30         Bent over rows L  5# 20 5# 30 6# 30 6# 30 6# 30 6# 30  6# 30         Corner pec stretch  15" 5 15" 5 15" 5 15" 5 15" 5 15" 5  15" 5         Retro UBE NV L6 5' L6 6' L6 6' L6 6' L6 6'  L6 6'         T-band L shoulder ER/IR NV Blue 20 Blue 20 Blue 20 ea Blue 20 ea Blue 20 ea  blue 20         T-band LPD B   Blue 20 Blue 30 Blue 30 Blue 30 Blue 30  blue 30         Standing scaptions B   NV 4# 2x10 4# 2x10 4# 2x10 3# 2x10  3# 2x10         Body blade L shoulder ER/IR, 90 degrees flex and abd       NV 15" ea 15" ea  20" 5         Supine medicine ball protraction           NV  7# 20                                                                                                                                                                                                                                                                                       Modalities

## 2018-04-23 NOTE — PROGRESS NOTES
PT Re-Evaluation     Today's date: 2018  Patient name: Hermelinda Elizondo  : 1955  MRN: [id number]  Referring provider: Kyara Donaldson MD  Dx:   Encounter Diagnosis     ICD-10-CM    1  Tendinopathy of rotator cuff, left M67 912    2  Intervertebral lumbar disc disorder with myelopathy, lumbar region M51 06                   Assessment  Impairments: abnormal or restricted ROM, activity intolerance, impaired balance, impaired physical strength, lacks appropriate home exercise program and pain with function    Assessment details: Hermelinda Elizondo is a 58 y o  male presenting to outpatient physical therapy at [place] [address] with complaints of LBP  He presents with decreased lumbar extension range of motion, decreased core strength, limited flexibility, poor postural awareness, poor body mechanics, decreased tolerance to activity and decreased functional mobility due to intervertebral lumbar disc disorder with myelopathy, lumbar region  He would benefit from skilled PT services in order to address these deficits and reach maximum level of function  Thank you for the referral!  Pt is progressing well with L shoulder pain aside from with abduction due to weakness and slight pain  Barriers to therapy: None  Understanding of Dx/Px/POC: excellent   Prognosis: good    Goals  ST  Independent with HEP in 2 weeks  2  Increase AROM L-spine to WNL all motions in 3 weeks  3  Good body mechanics in 2 weeks     LT  Achieve FOTO score of 67/100 in 6 weeks   2  Able to RTW full duty without LBP in 6 weeks  3  Strength abdominals = 4/5 in 6 weeks  4    No tightness or tenderness LB in 6 weeks    Plan  Patient would benefit from: skilled PT  Planned modality interventions: cryotherapy and TENS  Planned therapy interventions: abdominal trunk stabilization, ADL retraining, body mechanics training, flexibility, functional ROM exercises, home exercise program, joint mobilization, manual therapy, neuromuscular re-education, postural training, strengthening, stretching, therapeutic activities and therapeutic exercise  Frequency: 2x week  Duration in weeks: 6  Treatment plan discussed with: patient        Subjective Evaluation    History of Present Illness  Mechanism of injury: Pt reports that L shoulder is feeling better most times  Some L UT tightness and lateral shoulder pain  LBP is limiting him more  Has had LBP for many years, but more since 3/26/18 when getting out of bed  Has been OOW x 3 weeks due to LBP  His job requires him to bend down to very low levels frequently and lift heavy loads  Recurrent probem    Quality of life: good    Pain  Current pain ratin  At best pain ratin  At worst pain ratin  Quality: sharp and dull ache  Aggravating factors: lifting    Social Support    Employment status: not working    Diagnostic Tests  X-ray: abnormal (mild L2-3 R lumbar scolisis and DDD L5-S1  )  Patient Goals  Patient goals for therapy: increased strength, independence with ADLs/IADLs, return to sport/leisure activities, return to work, increased motion and decreased pain          Objective     Palpation   Left   Tenderness of the erector spinae  Right Tenderness of the erector spinae  Neurological Testing     Sensation     Lumbar   Left   Intact: light touch    Right   Intact: light touch    Active Range of Motion     Lumbar   Flexion: WFL  Extension: 50 degrees with pain  Left lateral flexion: WFL  Right lateral flexion: WFL  Left rotation: WF  Right rotation: [facility]    Additional Active Range of Motion Details  AROM listed is in percent available  Strength/Myotome Testing     Lumbar   Left   Normal strength    Right   Normal strength    Additional Strength Details  Abdominals = 3+/5  Tests       Thoracic   Negative slump  Lumbar   Positive repeated extension  Negative repeated flexion and slump  Left   Negative passive SLR and vertical compression       Right   Negative passive SLR and vertical compression  Additional Tests Details  FOTO = 57/100 for LBP           POC EXPIRES On:  5/29/18  PRECAUTIONS:  Chronic LBP  CO-MORBIDITES:  A-fib, diabetes, HTN, high cholesterol, sleep apnea, gout, obesity  PERSONAL FACTORS:  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#     Manual  4/3 4/5 4/10 4/12 4/17 4/19  4/23         L GH jt mobs - grade 3  NV 8' 7' 7' 7' 7'  7'         PROM L shoulder all motions in supine   2' 3' 3' 3' 3'  3'         MREs L shoulder ER/IR       NV NV 10  15         MREs PNF D2 flex/ext       NV NV 10  10                                       Exercise Diary  4/3 4/5 4/10 4/12 4/17 4/19  4/23         T-band rows B Plum 20 Plum 30 Plum 30 Plum 30 Plum 30 Plum 30  plum 30         Bent over rows L  5# 20 5# 30 6# 30 6# 30 6# 30 6# 30  6# 30         Corner pec stretch  15" 5 15" 5 15" 5 15" 5 15" 5 15" 5  15" 5         Retro UBE NV L6 5' L6 6' L6 6' L6 6' L6 6'  L6 6'         T-band L shoulder ER/IR NV Blue 20 Blue 20 Blue 20 ea Blue 20 ea Blue 20 ea  blue 20         T-band LPD B   Blue 20 Blue 30 Blue 30 Blue 30 Blue 30  blue 30         Standing scaptions B   NV 4# 2x10 4# 2x10 4# 2x10 3# 2x10  3# 2x10         Body blade L shoulder ER/IR, 90 degrees flex and abd       NV 15" ea 15" ea  20" 5         Supine medicine ball protraction           NV  7# 20                                                                                                                                                                                                                                                                                       Modalities

## 2018-04-26 ENCOUNTER — OFFICE VISIT (OUTPATIENT)
Dept: PHYSICAL THERAPY | Facility: CLINIC | Age: 63
End: 2018-04-26
Payer: COMMERCIAL

## 2018-04-26 DIAGNOSIS — M67.912 TENDINOPATHY OF ROTATOR CUFF, LEFT: Primary | ICD-10-CM

## 2018-04-26 DIAGNOSIS — M51.06 INTERVERTEBRAL LUMBAR DISC DISORDER WITH MYELOPATHY, LUMBAR REGION: ICD-10-CM

## 2018-04-26 PROCEDURE — 97110 THERAPEUTIC EXERCISES: CPT | Performed by: PHYSICAL THERAPIST

## 2018-04-26 PROCEDURE — 97140 MANUAL THERAPY 1/> REGIONS: CPT | Performed by: PHYSICAL THERAPIST

## 2018-04-26 NOTE — PROGRESS NOTES
Daily Note     Today's date: 2018  Patient name: Hamilton Fernández  : 1955  MRN: [id number]  Referring provider: Radha Munoz MD  Dx:   Encounter Diagnosis     ICD-10-CM    1  Tendinopathy of rotator cuff, left M67 912    2  Intervertebral lumbar disc disorder with myelopathy, lumbar region M51 06                   Subjective: Pt reports continued LBP and L shoulder locked up on him trying to reach to a head level shelf this morning  Objective: See treatment diary below      Assessment:  Pt presents to outpatient physical therapy at [place] [address] with complaints of LBP  He presents with decreased lumbar extension range of motion, decreased core strength, limited flexibility, poor postural awareness, poor body mechanics, decreased tolerance to activity and decreased functional mobility due to intervertebral lumbar disc disorder with myelopathy, lumbar region  He will continue to benefit from skilled PT services in order to address these deficits and reach maximum level of function  Pt is progressing well with L shoulder ROM and strength, but still has frequent lateral anterior L shoulder pain with abduction due to weakness  Mod less pain post manual tx  Did well with new lumbar there ex  Plan: To MD on   Increase core strengthening           POC EXPIRES On:  18  PRECAUTIONS:  Chronic LBP  CO-MORBIDITES:  A-fib, diabetes, HTN, high cholesterol, sleep apnea, gout, obesity  PERSONAL FACTORS:  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#     Manual  4/3 4/5 4/10 4/12 4/17 4/19  4/23  4/26       L GH jt mobs - grade 3  NV 8' 7' 7' 7' 7'  7'         PROM L shoulder all motions in supine   2' 3' 3' 3' 3'  3'         MREs L shoulder ER/IR       NV NV 10  15         MREs PNF D2 flex/ext       NV NV 10  10                                       Exercise Diary  4/3 4/5 4/10 4/12 4/17 4/19  4/23  4/26       T-band rows B Plum 20 Plum 30 Plum 30 Plum 30 Plum 30 Plum 30  plum 30  Plum 30       Bent over rows L  5# 20 5# 30 6# 30 6# 30 6# 30 6# 30  6# 30  6# 30       Corner pec stretch  15" 5 15" 5 15" 5 15" 5 15" 5 15" 5  15" 5 15" 5       Retro UBE NV L6 5' L6 6' L6 6' L6 6' L6 6'  L6 6'  L6 6'       T-band L shoulder ER/IR NV Blue 20 Blue 20 Blue 20 ea Blue 20 ea Blue 20 ea  blue 20  Plum 20       T-band LPD B   Blue 20 Blue 30 Blue 30 Blue 30 Blue 30  blue 30  Plum 30       Standing scaptions B   NV 4# 2x10 4# 2x10 4# 2x10 3# 2x10  3# 2x10  3# 2x10       Body blade L shoulder ER/IR, 90 degrees flex and abd       NV 15" ea 15" ea  20" 5  20" x2 ea       Supine medicine ball protraction           NV  7# 20  7# 30        PPT supine                5" 20        SKTC L/R                        LTR L/R                10" 10                                                                                                                                                                                                             Modalities

## 2018-05-01 ENCOUNTER — OFFICE VISIT (OUTPATIENT)
Dept: PHYSICAL THERAPY | Facility: CLINIC | Age: 63
End: 2018-05-01
Payer: COMMERCIAL

## 2018-05-01 DIAGNOSIS — M51.06 INTERVERTEBRAL LUMBAR DISC DISORDER WITH MYELOPATHY, LUMBAR REGION: ICD-10-CM

## 2018-05-01 DIAGNOSIS — M67.912 TENDINOPATHY OF ROTATOR CUFF, LEFT: Primary | ICD-10-CM

## 2018-05-01 PROCEDURE — 97140 MANUAL THERAPY 1/> REGIONS: CPT | Performed by: PHYSICAL THERAPIST

## 2018-05-01 PROCEDURE — 97112 NEUROMUSCULAR REEDUCATION: CPT | Performed by: PHYSICAL THERAPIST

## 2018-05-01 PROCEDURE — 97110 THERAPEUTIC EXERCISES: CPT | Performed by: PHYSICAL THERAPIST

## 2018-05-01 NOTE — PROGRESS NOTES
Daily Note     Today's date: 2018  Patient name: Jose Bass  : 1955  MRN: [id number]  Referring provider: Shawn Roche MD  Dx:   Encounter Diagnosis     ICD-10-CM    1  Tendinopathy of rotator cuff, left M67 912    2  Intervertebral lumbar disc disorder with myelopathy, lumbar region M51 06                    Subjective: Pt reports slightly less LBP since adding PT for it last week  L shoulder is still locking up on him occasionally when trying to reach to head level  Objective: See treatment diary below      Assessment:  Pt presents to outpatient physical therapy at [place] [address] with complaints of LBP and L shoulder pain  He presents with decreased lumbar extension range of motion, decreased core strength, limited flexibility, poor postural awareness, poor body mechanics, decreased tolerance to activity and decreased functional mobility due to intervertebral lumbar disc disorder with myelopathy, lumbar region  He will continue to benefit from skilled PT services in order to address these deficits and reach maximum level of function  Pt is progressing well with L shoulder ROM and strength, but still has frequent lateral anterior L shoulder pain with abduction due to weakness  Mod less pain post manual tx  Did well with new lumbar there ex and LE stretching  Plan: To MD on 18  Further increase core strengthening           POC EXPIRES On:  18  PRECAUTIONS:  Chronic LBP  CO-MORBIDITES:  A-fib, diabetes, HTN, high cholesterol, sleep apnea, gout, obesity  PERSONAL FACTORS:  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#     Manual                L GH jt mobs - grade 3  5'              PROM L shoulder all motions in supine  3'              MREs L shoulder ER/IR  15              MREs PNF D2 flex/ext  10               H/S stretching B  5'                           Exercise Diary                T-band rows B [place] 30              Bent over rows L  6# 20              Corner pec stretch  15" 5              Retro UBE L6 6'              T-band L shoulder ER/IR Plum 20              T-band LPD B  Plum 30              Standing scaptions B  2# 2x15              Body blade L shoulder ER/IR, 90 degrees flex and abd  20" 2 ea                Supine medicine ball protraction  7# 30                  PPT supine  5" 20                  SKTC L/R  5" 10                  LTR L/R  10" 10                                                                                                                                                                                                                       Modalities

## 2018-05-03 ENCOUNTER — OFFICE VISIT (OUTPATIENT)
Dept: PHYSICAL THERAPY | Facility: CLINIC | Age: 63
End: 2018-05-03
Payer: COMMERCIAL

## 2018-05-03 DIAGNOSIS — M67.912 TENDINOPATHY OF ROTATOR CUFF, LEFT: Primary | ICD-10-CM

## 2018-05-03 DIAGNOSIS — M51.06 INTERVERTEBRAL LUMBAR DISC DISORDER WITH MYELOPATHY, LUMBAR REGION: ICD-10-CM

## 2018-05-03 PROCEDURE — 97112 NEUROMUSCULAR REEDUCATION: CPT | Performed by: PHYSICAL THERAPIST

## 2018-05-03 PROCEDURE — 97140 MANUAL THERAPY 1/> REGIONS: CPT | Performed by: PHYSICAL THERAPIST

## 2018-05-03 PROCEDURE — 97110 THERAPEUTIC EXERCISES: CPT | Performed by: PHYSICAL THERAPIST

## 2018-05-03 NOTE — PROGRESS NOTES
Daily Note     Today's date: 5/3/2018  Patient name: Dinorah Mason  : 1955  MRN: [id number]  Referring provider: Natalie Bhatia MD  Dx:   Encounter Diagnosis     ICD-10-CM    1  Tendinopathy of rotator cuff, left M67 912    2  Intervertebral lumbar disc disorder with myelopathy, lumbar region M51 06                        Subjective: Pt reports slightly less LBP since adding PT for it last week, but having L shoulder pain and lateral arm pain especially in early AM upon waking  Slightly  better as day progresses as long as he doesn't reach out to the side  Objective: See treatment diary below      Assessment:  Pt presents to outpatient physical therapy at [place] [address] with complaints of LBP and L shoulder pain  He presents with decreased lumbar extension range of motion, decreased core strength, limited flexibility, poor postural awareness, poor body mechanics, decreased tolerance to activity and decreased functional mobility due to intervertebral lumbar disc disorder with myelopathy, lumbar region  He will continue to benefit from skilled PT services in order to address these deficits and reach maximum level of function  Pt is progressing well with L shoulder ROM and strength, but still has frequent lateral anterior L shoulder pain with abduction due to weakness and most pain upon waking  He sleeps in a recliner  Mod less pain post manual tx  Did well with new lumbar there ex and LE stretching  Plan: To MD on 18  Further increase core strengthening           POC EXPIRES On:  18  PRECAUTIONS:  Chronic LBP  CO-MORBIDITES:  A-fib, diabetes, HTN, high cholesterol, sleep apnea, gout, obesity  PERSONAL FACTORS:  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#     Manual  / 5/3             L GH jt mobs - grade 3  5' 5'             PROM L shoulder all motions in supine  3' 3'             MREs L shoulder ER/IR  15 15             MREs PNF D2 flex/ext  10 10              H/S stretching B  5'  5'                         Exercise Diary  5/1 5/3             T-band rows B Plum 30 Plum 30             Bent over rows L  6# 20 6# 20             Corner pec stretch  15" 5 15" 5             Retro UBE L6 6' L6 6'             T-band L shoulder ER/IR Plum 20 Plum 20             T-band LPD B  Plum 30 Plum 30             Standing scaptions B  2# 2x15 Held             Body blade L shoulder ER/IR, shoulder elevation /depression at 0 degrees  20" 2 ea  20" 3 ea              Supine medicine ball protraction  7# 30  7# 30                PPT supine  5" 20  5" 20                SKTC L/R  5" 10  5" 10                LTR L/R  10" 10  10" 10               Ball adduction in supine    10" 10                                                                                                                                                                                                  Modalities

## 2018-05-08 ENCOUNTER — OFFICE VISIT (OUTPATIENT)
Dept: PHYSICAL THERAPY | Facility: CLINIC | Age: 63
End: 2018-05-08
Payer: COMMERCIAL

## 2018-05-08 DIAGNOSIS — M67.912 TENDINOPATHY OF ROTATOR CUFF, LEFT: Primary | ICD-10-CM

## 2018-05-08 DIAGNOSIS — M51.06 INTERVERTEBRAL LUMBAR DISC DISORDER WITH MYELOPATHY, LUMBAR REGION: ICD-10-CM

## 2018-05-08 PROCEDURE — 97110 THERAPEUTIC EXERCISES: CPT | Performed by: PHYSICAL THERAPIST

## 2018-05-08 PROCEDURE — 97112 NEUROMUSCULAR REEDUCATION: CPT | Performed by: PHYSICAL THERAPIST

## 2018-05-08 PROCEDURE — 97140 MANUAL THERAPY 1/> REGIONS: CPT | Performed by: PHYSICAL THERAPIST

## 2018-05-08 NOTE — PROGRESS NOTES
Daily Note     Today's date: 2018  Patient name: David Castellano  : 1955  MRN: [id number]  Referring provider: Heide Treadwell MD  Dx:   Encounter Diagnosis     ICD-10-CM    1  Tendinopathy of rotator cuff, left M67 912    2  Intervertebral lumbar disc disorder with myelopathy, lumbar region M51 06                 Subjective: Pt reports having a lot less shoulder pain following the revision to his program this week  Slightly less LBP too  Objective: See treatment diary below      Assessment:  Pt presents to outpatient physical therapy at [place] [address] with complaints of LBP and L shoulder pain  He presents with decreased lumbar extension range of motion, decreased core strength, limited flexibility, poor postural awareness, poor body mechanics, decreased tolerance to activity and decreased functional mobility due to intervertebral lumbar disc disorder with myelopathy, lumbar region  He will continue to benefit from skilled PT services in order to address these deficits and reach maximum level of function  Pt is progressing well with L shoulder ROM and strength, but still has frequent lateral anterior L shoulder pain with abduction > flexion with outstretched arm  Pain is most apparent upon waking  He sleeps in a recliner  Progressing well with lumbar there ex and LE stretching  Plan: To MD on 18  Further increase core strengthening  Add bike           POC EXPIRES On:  18  PRECAUTIONS:  Chronic LBP  CO-MORBIDITES:  A-fib, diabetes, HTN, high cholesterol, sleep apnea, gout, obesity  PERSONAL FACTORS:  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#     Manual  5/1 5/3 5/8            L GH jt mobs - grade 3  5' 5' 5'            PROM L shoulder all motions in supine  3' 3' 3'            MREs L shoulder ER/IR  15 15 15            MREs PNF D2 flex/ext  10 10 10             H/S stretching B  5'  5'  5'                       Exercise Diary  5/1 5/3 5/8            T-band rows B Plum 30 Plum 30 Plum 30            Bent over rows L  6# 20 6# 20 6# 20            Corner pec stretch  15" 5 15" 5 15" 5            Retro UBE L6 6' L6 6' L6 6'            T-band L shoulder ER/IR Plum 20 Plum 20 Plum 20            T-band LPD B  Plum 30 Plum 30 Plum 30            Standing scaptions B  2# 2x15 Held             Body blade L shoulder ER/IR, shoulder elevation /depression at 0 degrees  20" 2 ea  20" 3 ea  20" 3 ea            Supine medicine ball protraction  7# 30  7# 30  7# 30              PPT supine  5" 20  5" 20  5" 20              SKTC L/R  5" 10  5" 10  5" 10              LTR L/R  10" 10  10" 10  10"10             Ball adduction in supine    10" 10   10" 10                  Bike       NV                                                                                                                                                                       Modalities

## 2018-05-10 ENCOUNTER — OFFICE VISIT (OUTPATIENT)
Dept: PHYSICAL THERAPY | Facility: CLINIC | Age: 63
End: 2018-05-10
Payer: COMMERCIAL

## 2018-05-10 DIAGNOSIS — M67.912 TENDINOPATHY OF ROTATOR CUFF, LEFT: Primary | ICD-10-CM

## 2018-05-10 DIAGNOSIS — M51.06 INTERVERTEBRAL LUMBAR DISC DISORDER WITH MYELOPATHY, LUMBAR REGION: ICD-10-CM

## 2018-05-10 PROCEDURE — 97112 NEUROMUSCULAR REEDUCATION: CPT | Performed by: PHYSICAL THERAPIST

## 2018-05-10 PROCEDURE — 97140 MANUAL THERAPY 1/> REGIONS: CPT | Performed by: PHYSICAL THERAPIST

## 2018-05-10 PROCEDURE — 97110 THERAPEUTIC EXERCISES: CPT | Performed by: PHYSICAL THERAPIST

## 2018-05-10 NOTE — PROGRESS NOTES
Daily Note     Today's date: 5/10/2018  Patient name: Lara Garcia  : 1955  MRN: [id number]  Referring provider: Ceci Kamara MD  Dx:   Encounter Diagnosis     ICD-10-CM    1  Tendinopathy of rotator cuff, left M67 912    2  Intervertebral lumbar disc disorder with myelopathy, lumbar region M51 06                     Subjective: Pt reports that he is feeling much better this week  Objective: See treatment diary below      Assessment:  Pt presents to outpatient physical therapy at [place] [address] with complaints of LBP and L shoulder pain  He presented with decreased lumbar extension range of motion, decreased core strength, limited flexibility, poor postural awareness, poor body mechanics, decreased tolerance to activity and decreased functional mobility due to intervertebral lumbar disc disorder with myelopathy, lumbar region  He will continue to benefit from skilled PT services in order to address these deficits and reach maximum level of function  Pt is progressing well with L shoulder ROM and strength, but still has frequent lateral anterior L shoulder pain with abduction > flexion with outstretched arm, but no longer has any crepitis in L shoulder and little sensation of LB "popping" anymore  Pain is most apparent upon waking  He sleeps in a recliner  Progressing well with lumbar there ex and LE stretching as well addition of bike today  Plan: To MD on 18  Further increase core strengthening           POC EXPIRES On:  18  PRECAUTIONS:  Chronic LBP  CO-MORBIDITES:  A-fib, diabetes, HTN, high cholesterol, sleep apnea, gout, obesity  PERSONAL FACTORS:  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#     Manual  5/1 5/3 5 510           L GH jt mobs - grade 3  5' 5' 5' 5'           PROM L shoulder all motions in supine  3' 3' 3' 3'           MREs L shoulder ER/IR  15 15 15 15           MREs PNF D2 flex/ext  10 10 10 10            H/S stretching B  5'  5'  5'  5'                   Exercise Diary  5/1 5/3 5/8 5/10           T-band rows B Plum 30 Plum 30 Plum 30 Plum 30           Bent over rows L  6# 20 6# 20 6# 20 6# 30           Corner pec stretch  15" 5 15" 5 15" 5 15" 5           Retro UBE L6 6' L6 6' L6 6' L7 6'           T-band L shoulder ER/IR Plum 20 Plum 20 Plum 20 Plum 20           T-band LPD B  Plum 30 Plum 30 Plum 30 Plum 30           Standing scaptions B  2# 2x15 Held             Body blade L shoulder ER/IR, shoulder elevation /depression at 0 degrees  20" 2 ea  20" 3 ea  20" 3 ea 20" 3 ea           Supine medicine ball protraction  7# 30  7# 30  7# 30  7# 30            PPT supine  5" 20  5" 20  5" 20  5" 20            SKTC L/R  5" 10  5" 10  5" 10  5" 10            LTR L/R  10" 10  10" 10  10"10  10" 10           Ball adduction in supine    10" 10   10" 10  10" 10                Bike       NV  L2 10'                                                                                                                                                                     Modalities

## 2018-05-15 ENCOUNTER — OFFICE VISIT (OUTPATIENT)
Dept: PHYSICAL THERAPY | Facility: CLINIC | Age: 63
End: 2018-05-15
Payer: COMMERCIAL

## 2018-05-15 DIAGNOSIS — M51.06 INTERVERTEBRAL LUMBAR DISC DISORDER WITH MYELOPATHY, LUMBAR REGION: ICD-10-CM

## 2018-05-15 DIAGNOSIS — M67.912 TENDINOPATHY OF ROTATOR CUFF, LEFT: Primary | ICD-10-CM

## 2018-05-15 PROCEDURE — 97140 MANUAL THERAPY 1/> REGIONS: CPT | Performed by: PHYSICAL THERAPIST

## 2018-05-15 PROCEDURE — 97110 THERAPEUTIC EXERCISES: CPT | Performed by: PHYSICAL THERAPIST

## 2018-05-15 PROCEDURE — 97112 NEUROMUSCULAR REEDUCATION: CPT | Performed by: PHYSICAL THERAPIST

## 2018-05-15 PROCEDURE — G8985 CARRY GOAL STATUS: HCPCS | Performed by: PHYSICAL THERAPIST

## 2018-05-15 PROCEDURE — G8984 CARRY CURRENT STATUS: HCPCS | Performed by: PHYSICAL THERAPIST

## 2018-05-15 NOTE — PROGRESS NOTES
Daily Note     Today's date: 5/15/2018  Patient name: Lucy Kirkland  : 1955  MRN: [id number]  Referring provider: Soniya Pike MD  Dx:   Encounter Diagnosis     ICD-10-CM    1  Tendinopathy of rotator cuff, left M67 912    2  Intervertebral lumbar disc disorder with myelopathy, lumbar region M51 06                   Subjective: Pt reports that he is feeling much better this week  Objective: See treatment diary below      Assessment:  Pt presents to outpatient physical therapy at [place] [address] with complaints of LBP and L shoulder pain  He presented with decreased lumbar extension range of motion, decreased core strength, limited flexibility, poor postural awareness, poor body mechanics, decreased tolerance to activity and decreased functional mobility due to intervertebral lumbar disc disorder with myelopathy, lumbar region  He will continue to benefit from skilled PT services in order to address these deficits and reach maximum level of function  Pt is progressing well with L shoulder ROM and strength, but still has frequent lateral anterior L shoulder pain with abduction > flexion with outstretched arm, but no longer has any crepitis in L shoulder and little sensation of LB "popping" anymore  Pain is most apparent upon waking  He sleeps in a recliner  Progressing well with lumbar ther ex and LE stretching as well addition of bike this week  May be ready for more functional work simulation tasks  Plan: To MD on 18  Further increase core strengthening  Consider adding functional box lift/carry next week for preparation for RTW  Last scheduled visit is currently on 18           POC EXPIRES On:  18  PRECAUTIONS:  Chronic LBP  CO-MORBIDITES:  A-fib, diabetes, HTN, high cholesterol, sleep apnea, gout, obesity  PERSONAL FACTORS:  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#     Manual  5/1 5/3 5/8 5/10 5/15          L GH jt mobs - grade 3  5' 5' 5' 5' 5'          PROM L shoulder all motions in supine  3' 3' 3' 3' 3'          MREs L shoulder ER/IR  15 15 15 15 15          MREs PNF D2 flex/ext  10 10 10 10 10           H/S stretching B  5'  5'  5'  5'  5'                   Exercise Diary  5/1 5/3 5/8 5/10 5/15          T-band rows B Plum 30 Plum 30 Plum 30 Plum 30 Plum 30          Bent over rows L  6# 20 6# 20 6# 20 6# 30 7# 30          Corner pec stretch  15" 5 15" 5 15" 5 15" 5 15" 5          Retro UBE L6 6' L6 6' L6 6' L7 6' L7 6'          T-band L shoulder ER/IR Plum 20 Plum 20 Plum 20 Plum 20 Plum 20          T-band LPD B  Plum 30 Plum 30 Plum 30 Plum 30 Plum 30          Standing scaptions B  2# 2x15 Held             Body blade L shoulder ER/IR, shoulder elevation /depression at 0 degrees  20" 2 ea  20" 3 ea  20" 3 ea 20" 3 ea 20" 3 ea          Supine medicine ball protraction  7# 30  7# 30  7# 30  7# 30  7# 30          PPT supine  5" 20  5" 20  5" 20  5" 20  5" 20          SKTC L/R  5" 10  5" 10  5" 10  5" 10  5" 10          LTR L/R  10" 10  10" 10  10"10  10" 10  10" 10         Ball adduction in supine    10" 10   10" 10  10" 10  10" 10              Bike       NV  L2 10'  L2 10'              Lift F<->W          NV  20#            Crate carry           NV  20#                                                                                                                 Modalities

## 2018-05-18 ENCOUNTER — OFFICE VISIT (OUTPATIENT)
Dept: PHYSICAL THERAPY | Facility: CLINIC | Age: 63
End: 2018-05-18
Payer: COMMERCIAL

## 2018-05-18 DIAGNOSIS — M51.06 INTERVERTEBRAL LUMBAR DISC DISORDER WITH MYELOPATHY, LUMBAR REGION: ICD-10-CM

## 2018-05-18 DIAGNOSIS — M67.912 TENDINOPATHY OF ROTATOR CUFF, LEFT: Primary | ICD-10-CM

## 2018-05-18 PROCEDURE — 97140 MANUAL THERAPY 1/> REGIONS: CPT

## 2018-05-18 PROCEDURE — G8986 CARRY D/C STATUS: HCPCS | Performed by: PHYSICAL THERAPIST

## 2018-05-18 PROCEDURE — 97112 NEUROMUSCULAR REEDUCATION: CPT

## 2018-05-18 PROCEDURE — G8985 CARRY GOAL STATUS: HCPCS | Performed by: PHYSICAL THERAPIST

## 2018-05-18 PROCEDURE — 97110 THERAPEUTIC EXERCISES: CPT

## 2018-05-18 NOTE — PROGRESS NOTES
Daily Note     Today's date: 2018  Patient name: Veto Nazario  : 1955  MRN: [id number]  Referring provider: Stefanie Gonzales MD  Dx:   Encounter Diagnosis     ICD-10-CM    1  Tendinopathy of rotator cuff, left M67 912    2  Intervertebral lumbar disc disorder with myelopathy, lumbar region M51 06                   Subjective: Pt reports that he is seeing slow progress with therapy noting that he has relief for 3 hours or so after each session  Objective: See treatment diary below      Assessment:  Pt presents to outpatient physical therapy at [place] [address] with complaints of LBP and L shoulder pain  He presented with decreased lumbar extension range of motion, decreased core strength, limited flexibility, poor postural awareness, poor body mechanics, decreased tolerance to activity and decreased functional mobility due to intervertebral lumbar disc disorder with myelopathy, lumbar region  He will continue to benefit from skilled PT services in order to address these deficits and reach maximum level of function  Focused more on shoulder today due to back feeling good  Also demonstrated a good tolerance with the crate carry and lift  Plan: To MD on 18  Further increase core strengthening  Consider adding functional box lift/carry next week for preparation for RTW            POC EXPIRES On:  18  PRECAUTIONS:  Chronic LBP  CO-MORBIDITES:  A-fib, diabetes, HTN, high cholesterol, sleep apnea, gout, obesity  PERSONAL FACTORS:  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#     Manual  5/1 5/3 5/8 5/10 5/15 5/18         L GH jt mobs - grade 3  5' 5' 5' 5' 5' nt         PROM L shoulder all motions in supine  3' 3' 3' 3' 3' 3'         MREs L shoulder ER/IR  15 15 15 15 15 15         MREs PNF D2 flex/ext  10 10 10 10 10 10          H/S stretching B  5'  5'  5'  5'  5'  5'                 Exercise Diary  5/1 5/3 5/8 5/10 5/15 5/18         T-band rows B Plum 30 Plum 30 Plum 30 Plum 30 Plum 30 Plum 30         Bent over rows L  6# 20 6# 20 6# 20 6# 30 7# 30 7# 30         Corner pec stretch  15" 5 15" 5 15" 5 15" 5 15" 5 15"x5         Retro UBE L6 6' L6 6' L6 6' L7 6' L7 6' L7 6'         T-band L shoulder ER/IR Plum 20 Plum 20 Plum 20 Plum 20 Plum 20 Plum 20         T-band LPD B  Plum 30 Plum 30 Plum 30 Plum 30 Plum 30 Plum 30         Standing scaptions B  2# 2x15 Held             Body blade L shoulder ER/IR, shoulder elevation /depression at 0 degrees  20" 2 ea  20" 3 ea  20" 3 ea 20" 3 ea 20" 3 ea 20"x3 ea         Supine medicine ball protraction  7# 30  7# 30  7# 30  7# 30  7# 30 7# 30         PPT supine  5" 20  5" 20  5" 20  5" 20  5" 20          SKTC L/R  5" 10  5" 10  5" 10  5" 10  5" 10          LTR L/R  10" 10  10" 10  10"10  10" 10  10" 10         Ball adduction in supine    10" 10   10" 10  10" 10  10" 10  10"x10            Bike       NV  L2 10'  L2 10'  L2 10'            Lift F<->W          NV  20# 100'            Crate carry           NV  20# 5x                                                                                                                 Modalities

## 2018-06-28 ENCOUNTER — OFFICE VISIT (OUTPATIENT)
Dept: OBGYN CLINIC | Facility: CLINIC | Age: 63
End: 2018-06-28
Payer: COMMERCIAL

## 2018-06-28 VITALS
SYSTOLIC BLOOD PRESSURE: 190 MMHG | DIASTOLIC BLOOD PRESSURE: 86 MMHG | HEART RATE: 86 BPM | WEIGHT: 315 LBS | HEIGHT: 70 IN | BODY MASS INDEX: 45.1 KG/M2

## 2018-06-28 DIAGNOSIS — M67.912 TENDINOPATHY OF ROTATOR CUFF, LEFT: Primary | ICD-10-CM

## 2018-06-28 PROCEDURE — 99213 OFFICE O/P EST LOW 20 MIN: CPT | Performed by: ORTHOPAEDIC SURGERY

## 2018-06-28 NOTE — ASSESSMENT & PLAN NOTE
58-year-old male with left shoulder rotator cuff tendinopathy  Discussed importance of continuing home exercises and avoiding repetitive overhead lifting  Discussed repeat cortisone injection, but the patient does not know if his blood sugars are controlled at this time  Explained that if he monitors his blood sugars and they are controlled, he may return at any time for a repeat subacromial cortisone injection  Follow-up as needed  All questions were answered to their satisfaction

## 2018-06-28 NOTE — PROGRESS NOTES
Assessment:     1  Tendinopathy of rotator cuff, left        Plan:  The patient was seen and examined by Dr Amanda Bullock and myself  Problem List Items Addressed This Visit        Musculoskeletal and Integument    Tendinopathy of rotator cuff, left - Primary     44-year-old male with left shoulder rotator cuff tendinopathy  Discussed importance of continuing home exercises and avoiding repetitive overhead lifting  Discussed repeat cortisone injection, but the patient does not know if his blood sugars are controlled at this time  Explained that if he monitors his blood sugars and they are controlled, he may return at any time for a repeat subacromial cortisone injection  Follow-up as needed  All questions were answered to their satisfaction  Subjective:     Patient ID: Jess Romero is a 58 y o  male  Chief Complaint:  44-year-old male following up for left shoulder tendinitis  He attended 6 weeks of physical therapy with minimal improvement  He is able to continue to do his job without significant pain  He was taught the home exercises, but admits he does not do them  Patient is not interested in having any surgery  Allergy:  Allergies   Allergen Reactions    Iodine Other (See Comments)     "KIDNEY DYE"     Medications:  all current active meds have been reviewed  Past Medical History:  Past Medical History:   Diagnosis Date    Back muscle spasm     Diabetes insipidus ([address] )     High cholesterol     Hypertension      Past Surgical History:  History reviewed  No pertinent surgical history  Family History:  Family History   Problem Relation Age of Onset    No Known Problems Mother     No Known Problems Father      Social History:  History   Alcohol Use No     History   Drug Use No     History   Smoking Status    Never Smoker   Smokeless Tobacco    Never Used     Review of Systems   Constitutional: Negative  HENT: Negative  Eyes: Negative  Respiratory: Negative  Cardiovascular: Positive for palpitations and leg swelling  Gastrointestinal: Negative  Endocrine: Negative  Genitourinary: Negative  Musculoskeletal: Positive for arthralgias, joint swelling and myalgias  Skin: Negative  Allergic/Immunologic: Negative  Neurological: Negative  Hematological: Negative  Psychiatric/Behavioral: Negative  Objective:  BP Readings from Last 1 Encounters:   06/28/18 (!) 190/86      Wt Readings from Last 1 Encounters:   06/28/18 (!) 164 kg (361 lb)      BMI:   Estimated body mass index is 51 8 kg/m² as calculated from the following:    Height as of this encounter: 5' 10" (1 778 m)  Weight as of this encounter: 164 kg (361 lb)  BSA:   Estimated body surface area is 2 68 meters squared as calculated from the following:    Height as of this encounter: 5' 10" (1 778 m)  Weight as of this encounter: 164 kg (361 lb)  Physical Exam   Constitutional: He is oriented to person, place, and time  He appears well-developed and well-nourished  No distress  HENT:   Head: Normocephalic and atraumatic  Eyes: Right eye exhibits no discharge  Left eye exhibits no discharge  Neck: Normal range of motion  Neck supple  No tracheal deviation present  Cardiovascular: Normal rate and regular rhythm  Pulmonary/Chest: Effort normal  No respiratory distress  Abdominal: Soft  He exhibits no distension  There is no tenderness  Neurological: He is alert and oriented to person, place, and time  Skin: Skin is warm  He is not diaphoretic  No erythema  Psychiatric: He has a normal mood and affect  His behavior is normal      Right Shoulder Exam     Tenderness   The patient is experiencing no tenderness  Range of Motion   The patient has normal right shoulder ROM  Muscle Strength   The patient has normal right shoulder strength      Tests   Apprehension: negative  Cross Arm: negative  Hawkin's test: negative  Impingement: negative    Other   Erythema: absent  Sensation: normal  Pulse: present      Left Shoulder Exam     Tenderness   Left shoulder tenderness location: Tender to palpation over the anterior bursa and anterior joint capsule  Range of Motion   The patient has normal left shoulder ROM  Muscle Strength   The patient has normal left shoulder strength  Tests   Apprehension: negative  Cross Arm: negative  Drop Arm: negative  Hawkin's test: positive  Impingement: positive    Other   Erythema: absent  Sensation: normal  Pulse: present     Comments:  Negative speed's, negative [place]'s, pain at extremes of motion, pain with range of motion            I have personally reviewed pertinent films in PACS and my interpretation is Very minimal calcific tendinitis at the insertion of the supraspinatus on the greater tuberosity  MRI shows some near full-thickness rotator cuff tearing, but no discrete tears  No retraction  No muscle atrophy  No evidence of labral tears  Rashida Greer

## 2018-08-30 RX ORDER — AZITHROMYCIN 250 MG/1
TABLET, FILM COATED ORAL
COMMUNITY
Start: 2018-05-23 | End: 2019-01-10

## 2018-08-30 RX ORDER — LANSOPRAZOLE 30 MG/1
CAPSULE, DELAYED RELEASE ORAL EVERY 24 HOURS
COMMUNITY
Start: 2018-04-09 | End: 2021-03-06

## 2018-08-30 RX ORDER — DOCUSATE SODIUM 100 MG/1
CAPSULE, LIQUID FILLED ORAL EVERY 24 HOURS
COMMUNITY
Start: 2015-04-24 | End: 2020-11-02

## 2018-08-30 RX ORDER — TAMSULOSIN HYDROCHLORIDE 0.4 MG/1
CAPSULE ORAL EVERY 24 HOURS
COMMUNITY
Start: 2017-11-17 | End: 2021-01-28 | Stop reason: SDUPTHER

## 2018-08-30 RX ORDER — FLUTICASONE PROPIONATE 50 MCG
SPRAY, SUSPENSION (ML) NASAL
COMMUNITY
Start: 2015-07-07 | End: 2021-08-11 | Stop reason: ALTCHOICE

## 2018-08-30 RX ORDER — AMOXICILLIN AND CLAVULANATE POTASSIUM 875; 125 MG/1; MG/1
TABLET, FILM COATED ORAL EVERY 12 HOURS
COMMUNITY
Start: 2018-05-30 | End: 2019-01-10

## 2018-08-30 RX ORDER — METHYLPREDNISOLONE 4 MG
TABLET, DOSE PACK ORAL EVERY 12 HOURS
COMMUNITY
Start: 2015-04-24 | End: 2020-11-02

## 2018-08-30 RX ORDER — COLCHICINE 0.6 MG/1
TABLET ORAL EVERY 12 HOURS
COMMUNITY
Start: 2015-07-07 | End: 2019-02-22 | Stop reason: ALTCHOICE

## 2018-08-30 RX ORDER — NIACIN 500 MG/1
TABLET, EXTENDED RELEASE ORAL
COMMUNITY
Start: 2017-06-19 | End: 2018-11-26 | Stop reason: SDUPTHER

## 2018-08-30 RX ORDER — NAPROXEN 500 MG/1
TABLET ORAL EVERY 12 HOURS
COMMUNITY
Start: 2015-12-03 | End: 2020-11-02

## 2018-10-08 DIAGNOSIS — R60.0 EDEMA OF BOTH LOWER EXTREMITIES: Primary | ICD-10-CM

## 2018-10-08 RX ORDER — FUROSEMIDE 20 MG/1
TABLET ORAL
Qty: 90 TABLET | Refills: 1 | Status: SHIPPED | OUTPATIENT
Start: 2018-10-08 | End: 2020-11-02

## 2018-10-29 ENCOUNTER — TELEPHONE (OUTPATIENT)
Dept: FAMILY MEDICINE CLINIC | Facility: CLINIC | Age: 63
End: 2018-10-29

## 2018-10-30 LAB
ALBUMIN SERPL-MCNC: 3.9 G/DL (ref 3.6–5.1)
ALBUMIN/GLOB SERPL: 1.4 (CALC) (ref 1–2.5)
ALP SERPL-CCNC: 78 U/L (ref 40–115)
ALT SERPL-CCNC: 35 U/L (ref 9–46)
AST SERPL-CCNC: 20 U/L (ref 10–35)
BASOPHILS # BLD AUTO: 47 CELLS/UL (ref 0–200)
BASOPHILS NFR BLD AUTO: 0.7 %
BILIRUB SERPL-MCNC: 0.6 MG/DL (ref 0.2–1.2)
BUN SERPL-MCNC: 38 MG/DL (ref 7–25)
BUN/CREAT SERPL: 24 (CALC) (ref 6–22)
CALCIUM SERPL-MCNC: 10 MG/DL (ref 8.6–10.3)
CHLORIDE SERPL-SCNC: 98 MMOL/L (ref 98–110)
CHOLEST SERPL-MCNC: 241 MG/DL
CHOLEST/HDLC SERPL: 5.9 (CALC)
CO2 SERPL-SCNC: 31 MMOL/L (ref 20–32)
CREAT SERPL-MCNC: 1.57 MG/DL (ref 0.7–1.25)
EOSINOPHIL # BLD AUTO: 141 CELLS/UL (ref 15–500)
EOSINOPHIL NFR BLD AUTO: 2.1 %
ERYTHROCYTE [DISTWIDTH] IN BLOOD BY AUTOMATED COUNT: 13.9 % (ref 11–15)
EST. AVERAGE GLUCOSE BLD GHB EST-MCNC: 332 (CALC)
EST. AVERAGE GLUCOSE BLD GHB EST-SCNC: 18.4 (CALC)
GLOBULIN SER CALC-MCNC: 2.8 G/DL (CALC) (ref 1.9–3.7)
GLUCOSE SERPL-MCNC: 362 MG/DL (ref 65–99)
HBA1C MFR BLD: 13.2 % OF TOTAL HGB
HCT VFR BLD AUTO: 41.2 % (ref 38.5–50)
HDLC SERPL-MCNC: 41 MG/DL
HGB BLD-MCNC: 13.7 G/DL (ref 13.2–17.1)
LYMPHOCYTES # BLD AUTO: 1963 CELLS/UL (ref 850–3900)
LYMPHOCYTES NFR BLD AUTO: 29.3 %
MCH RBC QN AUTO: 29.3 PG (ref 27–33)
MCHC RBC AUTO-ENTMCNC: 33.3 G/DL (ref 32–36)
MCV RBC AUTO: 88.2 FL (ref 80–100)
MONOCYTES # BLD AUTO: 509 CELLS/UL (ref 200–950)
MONOCYTES NFR BLD AUTO: 7.6 %
NEUTROPHILS # BLD AUTO: 4040 CELLS/UL (ref 1500–7800)
NEUTROPHILS NFR BLD AUTO: 60.3 %
NONHDLC SERPL-MCNC: 200 MG/DL (CALC)
PLATELET # BLD AUTO: 173 THOUSAND/UL (ref 140–400)
PMV BLD REES-ECKER: 11.9 FL (ref 7.5–12.5)
POTASSIUM SERPL-SCNC: 4.9 MMOL/L (ref 3.5–5.3)
PROT SERPL-MCNC: 6.7 G/DL (ref 6.1–8.1)
RBC # BLD AUTO: 4.67 MILLION/UL (ref 4.2–5.8)
SL AMB EGFR AFRICAN AMERICAN: 53 ML/MIN/1.73M2
SL AMB EGFR NON AFRICAN AMERICAN: 46 ML/MIN/1.73M2
SODIUM SERPL-SCNC: 137 MMOL/L (ref 135–146)
TRIGL SERPL-MCNC: 418 MG/DL
WBC # BLD AUTO: 6.7 THOUSAND/UL (ref 3.8–10.8)

## 2018-11-26 DIAGNOSIS — M1A.9XX0 CHRONIC GOUT WITHOUT TOPHUS, UNSPECIFIED CAUSE, UNSPECIFIED SITE: ICD-10-CM

## 2018-11-26 DIAGNOSIS — I10 ESSENTIAL HYPERTENSION: Primary | ICD-10-CM

## 2018-11-26 RX ORDER — METOPROLOL SUCCINATE 100 MG/1
TABLET, EXTENDED RELEASE ORAL
Qty: 180 TABLET | Refills: 3 | Status: SHIPPED | OUTPATIENT
Start: 2018-11-26 | End: 2019-11-11 | Stop reason: SDUPTHER

## 2018-11-26 RX ORDER — POTASSIUM CHLORIDE 20 MEQ/1
TABLET, EXTENDED RELEASE ORAL
Qty: 180 TABLET | Refills: 3 | Status: SHIPPED | OUTPATIENT
Start: 2018-11-26 | End: 2019-11-11 | Stop reason: SDUPTHER

## 2018-11-26 RX ORDER — NIACIN 500 MG/1
TABLET, EXTENDED RELEASE ORAL
Qty: 360 TABLET | Refills: 2 | Status: SHIPPED | OUTPATIENT
Start: 2018-11-26 | End: 2020-11-02

## 2018-11-26 RX ORDER — ALLOPURINOL 100 MG/1
TABLET ORAL
Qty: 180 TABLET | Refills: 3 | Status: SHIPPED | OUTPATIENT
Start: 2018-11-26 | End: 2019-11-11 | Stop reason: SDUPTHER

## 2019-01-07 DIAGNOSIS — E11.9 TYPE 2 DIABETES MELLITUS WITHOUT COMPLICATION, WITHOUT LONG-TERM CURRENT USE OF INSULIN (HCC): Primary | ICD-10-CM

## 2019-01-10 ENCOUNTER — OFFICE VISIT (OUTPATIENT)
Dept: FAMILY MEDICINE CLINIC | Facility: CLINIC | Age: 64
End: 2019-01-10
Payer: COMMERCIAL

## 2019-01-10 VITALS
HEART RATE: 64 BPM | HEIGHT: 70 IN | TEMPERATURE: 98.6 F | OXYGEN SATURATION: 96 % | WEIGHT: 315 LBS | DIASTOLIC BLOOD PRESSURE: 82 MMHG | BODY MASS INDEX: 45.1 KG/M2 | SYSTOLIC BLOOD PRESSURE: 140 MMHG

## 2019-01-10 DIAGNOSIS — E11.8 TYPE 2 DIABETES MELLITUS WITH COMPLICATION, UNSPECIFIED WHETHER LONG TERM INSULIN USE: ICD-10-CM

## 2019-01-10 DIAGNOSIS — N18.30 STAGE 3 CHRONIC KIDNEY DISEASE DUE TO TYPE 2 DIABETES MELLITUS (HCC): ICD-10-CM

## 2019-01-10 DIAGNOSIS — Z91.89 ENCOUNTER FOR HEPATITIS C VIRUS SCREENING TEST FOR HIGH RISK PATIENT: Primary | ICD-10-CM

## 2019-01-10 DIAGNOSIS — E11.22 STAGE 3 CHRONIC KIDNEY DISEASE DUE TO TYPE 2 DIABETES MELLITUS (HCC): ICD-10-CM

## 2019-01-10 DIAGNOSIS — Z12.11 ENCOUNTER FOR SCREENING COLONOSCOPY: ICD-10-CM

## 2019-01-10 DIAGNOSIS — E78.2 MIXED HYPERLIPIDEMIA: ICD-10-CM

## 2019-01-10 DIAGNOSIS — Z11.59 ENCOUNTER FOR HEPATITIS C VIRUS SCREENING TEST FOR HIGH RISK PATIENT: Primary | ICD-10-CM

## 2019-01-10 DIAGNOSIS — G47.33 OBSTRUCTIVE SLEEP APNEA OF ADULT: ICD-10-CM

## 2019-01-10 DIAGNOSIS — M67.912 TENDINOPATHY OF ROTATOR CUFF, LEFT: ICD-10-CM

## 2019-01-10 DIAGNOSIS — I10 BENIGN ESSENTIAL HYPERTENSION: ICD-10-CM

## 2019-01-10 PROCEDURE — 99214 OFFICE O/P EST MOD 30 MIN: CPT | Performed by: INTERNAL MEDICINE

## 2019-01-10 NOTE — PROGRESS NOTES
Assessment/Plan:         Diagnoses and all orders for this visit:     Encounter for hepatitis C virus screening test for high risk patient  -     Hepatitis C antibody    Type 2 diabetes mellitus with complication, unspecified whether long term insulin use (HCC)  -     Microalbumin / creatinine urine ratio  -     Comprehensive metabolic panel  -     Hemoglobin A1C  -     Microalbumin / creatinine urine ratio    Patient was start checking blood sugars regularly diet modification close follow-up in a couple of weeks with blood sugars  Consider strongly endocrinology appointment See HPI above  Encounter for screening colonoscopy  -     Ambulatory referral to Colorectal Surgery; Future    Stage 3 chronic kidney disease due to type 2 diabetes mellitus ([address] )   repeat lab studies  microalbumin will be ordered  Obstructive sleep apnea of adult    Continue with CPAP machine  Mixed hyperlipidemia  -     Lipid panel    Benign essential hypertension     Stable continue current regimen    Subjective:      Patient ID: Heath Current is a 58 y o  male  This was Dr Asad Alford patient  Patient with history of diabetes mellitus hypertension hyperlipidemia morbid obesity  Sleep apnea CPAP machine 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   Her blood work in October 2018 that showed hemoglobin A1c of 13 1  His hemoglobin A1c prior to that the beginning of the year was 6 7 and prior to that was 8 7  Patient reports  He has not been checking his blood sugar at home  Had been feeling fine but has been urinating somewhat more  Has has numbness and tingling has been seeing Dr Cheryle Naidu for several years  Does not have any known retinopathy but has not seen an eye doctor as well  He has seen a podiatrist in the past     Denies any chest pain shortness of breath lightheadedness or palpitation  Had a stress test done  Twice in his life last 1 done in [facility] with Dr Celina Barragan    Patient has been on statins  His triglycerides were quite elevated LDL could not be calculated  He tells me that several doctors have told me that I might have a stroke  However he does time that he has not been eating right not been exercising  When I reviewed the lab studies he look surprised  However it appears that Dr Trung Ocampo did discuss the labs with the patient and need to see an endocrinologist   Patient wants to try diet and exercise and would like to hold off on seeing endocrinologist   His kidney functions have also gone worse  His creatinine is elevated at 1 57 with GFR of 46%  Urine for microalbumin would be ordered        Current Outpatient Prescriptions:     allopurinol (ZYLOPRIM) 100 mg tablet, TAKE 1 TABLET TWICE A DAY, Disp: 180 tablet, Rfl: 3    B Complex Vitamins (B COMPLEX 1 PO), Take by mouth 2 (two) times a day  , Disp: , Rfl:     Cinnamon 500 MG TABS, Take by mouth 2 (two) times a day  , Disp: , Rfl:     colchicine (COLCRYS) 0 6 mg tablet, Every 12 hours, Disp: , Rfl:     cyanocobalamin (VITAMIN B-12) 100 mcg tablet, Take by mouth, Disp: , Rfl:     docusate sodium (COLACE) 100 mg capsule, every 24 hours, Disp: , Rfl:     fluticasone (FLONASE) 50 mcg/act nasal spray, inhale 2 spray by intranasal route  every day in each nostril as needed, Disp: , Rfl:     furosemide (LASIX) 20 mg tablet, TAKE 1 TABLET DAILY AS NEEDED FOR EDEMA, Disp: 90 tablet, Rfl: 1    gabapentin (NEURONTIN) 300 mg capsule, , Disp: , Rfl:     glimepiride (AMARYL) 4 mg tablet, , Disp: , Rfl:     Glucosamine Sulfate 500 MG TABS, Every 12 hours, Disp: , Rfl:     hydrochlorothiazide (HYDRODIURIL) 25 mg tablet, , Disp: , Rfl:     irbesartan (AVAPRO) 300 mg tablet, Take 1 tablet by mouth daily, Disp: , Rfl:     lansoprazole (PREVACID) 30 mg capsule, every 24 hours, Disp: , Rfl:     metFORMIN (GLUCOPHAGE) 1000 MG tablet, TAKE ONE TABLET BY MOUTH TWICE DAILY WITH MORNING AND EVENING MEALS, Disp: 90 tablet, Rfl: 0    metoprolol succinate (TOPROL-XL) 100 mg 24 hr tablet, TAKE 1 TABLET TWICE A DAY, Disp: 180 tablet, Rfl: 3    Multiple Vitamins-Minerals (MULTIVITAMIN ADULT) TABS, every 24 hours, Disp: , Rfl:     naproxen (NAPROSYN) 500 mg tablet, Every 12 hours, Disp: , Rfl:     niacin (NIASPAN) 500 mg CR tablet, TAKE 4 TABLETS DAILY AT BEDTIME AFTER A LOW-FAT SNACK, Disp: 360 tablet, Rfl: 2    nystatin-triamcinolone (MYCOLOG-II) cream, Apply topically 3 (three) times a day, Disp: , Rfl:     Omega-3 Fatty Acids (FISH OIL) 1,000 mg, Take 1 capsule by mouth 2 (two) times a day, Disp: , Rfl:     Omega-3 Fatty Acids (FISH OIL) 1,000 mg, Take 2 capsules daily, Disp: , Rfl:     potassium chloride (K-DUR,KLOR-CON) 20 mEq tablet, TAKE 1 TABLET TWICE A DAY WITH FOOD, Disp: 180 tablet, Rfl: 3    rosuvastatin (CRESTOR) 20 MG tablet, Take 1 tablet by mouth daily, Disp: , Rfl:     sitaGLIPtin (JANUVIA) 100 mg tablet, Take 1 tablet by mouth daily, Disp: , Rfl:     tamsulosin (FLOMAX) 0 4 mg, every 24 hours, Disp: , Rfl:     Vitamin D, Ergocalciferol, 2000 units CAPS, take 1 Capsule by Oral route once, Disp: , Rfl:     The following portions of the patient's history were reviewed and updated as appropriate: allergies, current medications, past family history, past medical history, past social history, past surgical history and problem list     Review of Systems   Constitutional: Negative for chills and fever  Respiratory: Negative for cough and shortness of breath  History of sleep apnea and uses CPAP machine   Cardiovascular: Negative for chest pain, palpitations and leg swelling (Minimal edema feet)  Gastrointestinal: Negative for abdominal pain, blood in stool and constipation  Had a colonoscopy 2015 a serrated polyp was found   Endocrine:        Does not check his blood sugar  Sees podiatry Dr Kiarra Hallman   Genitourinary: Negative for difficulty urinating  Neurological: Positive for numbness  Negative for dizziness  Psychiatric/Behavioral: Negative for agitation  Objective:      /82 (BP Location: Left arm, Patient Position: Sitting, Cuff Size: Large)   Pulse 64   Temp 98 6 °F (37 °C) (Tympanic)   Ht 5' 10" (1 778 m)   Wt (!) 157 kg (346 lb 12 8 oz)   SpO2 96%   BMI 49 76 kg/m²          Physical Exam   Constitutional: He is oriented to person, place, and time  No distress  Morbidly obese   Eyes: Conjunctivae are normal    Cardiovascular: Normal rate and normal heart sounds  No murmur heard  Pulmonary/Chest: Effort normal and breath sounds normal  No respiratory distress  He has no wheezes  He has no rales  Abdominal: Bowel sounds are normal  He exhibits no distension  There is no tenderness  There is no rebound  Obese nontender   Musculoskeletal: He exhibits edema (Trace edema feet)  Neurological: He is alert and oriented to person, place, and time  Psychiatric: He has a normal mood and affect   His behavior is normal

## 2019-01-10 NOTE — PROGRESS NOTES
Assessment and Plan:  Problem List Items Addressed This Visit     None      Visit Diagnoses     Encounter for hepatitis C virus screening test for high risk patient    -  Primary    Relevant Orders    Hepatitis C antibody    Type 2 diabetes mellitus with complication, unspecified whether long term insulin use ([facility] )        Relevant Orders    Microalbumin / creatinine urine ratio        Health Maintenance Due   Topic Date Due    Hepatitis C Screening  1955    CRC Screening: Colonoscopy  1955    Diabetic Foot Exam  04/03/1964    DM Eye Exam  04/03/1964    URINE MICROALBUMIN  04/03/1964    INFLUENZA VACCINE  07/01/2018         HPI:  Patient Active Problem List   Diagnosis    Tendinopathy of rotator cuff, left     Past Medical History:   Diagnosis Date    Back muscle spasm     [facility] insip[facility]us [facility]     Diabetes mellitus ([facility] )     High cholesterol     Hypertension      History reviewed  No pertinent surgical history  Family History   Problem Relation Age of Onset    No Known Problems Mother     No Known Problems Father     Hypertension Family     Diabetes Family      History   Smoking Status    Never Smoker   Smokeless Tobacco    Never Used     Comment: no passive smoke exposure  Reina Hernandez Per [employer], former cigarette smoker who quit in 01/01/2004     History   Alcohol Use No      History   Drug Use No         Current Outpatient Prescriptions   Medication Sig Dispense Refill    allopurinol (ZYLOPRIM) 100 mg tablet TAKE 1 TABLET TWICE A  tablet 3    B Complex Vitamins (B COMPLEX 1 PO) Take by mouth 2 (two) times a day        Cinnamon 500 MG TABS Take by mouth 2 (two) times a day        colchicine (COLCRYS) 0 6 mg tablet Every 12 hours      cyanocobalamin (VITAMIN B-12) 100 mcg tablet Take by mouth      docusate sodium (COLACE) 100 mg capsule every 24 hours      fluticasone (FLONASE) 50 mcg/act nasal spray inhale 2 spray by intranasal route  every day in each nostril as needed      furosemide (LASIX) 20 mg tablet TAKE 1 TABLET DAILY AS NEEDED FOR EDEMA 90 tablet 1    gabapentin (NEURONTIN) 300 mg capsule       glimepiride (AMARYL) 4 mg tablet       Glucosamine Sulfate 500 MG TABS Every 12 hours      hydrochlorothiazide (HYDRODIURIL) 25 mg tablet       irbesartan (AVAPRO) 300 mg tablet Take 1 tablet by mouth daily      lansoprazole (PREVACID) 30 mg capsule every 24 hours      metFORMIN (GLUCOPHAGE) 1000 MG tablet TAKE ONE TABLET BY MOUTH TWICE DAILY WITH MORNING AND EVENING MEALS 90 tablet 0    metoprolol succinate (TOPROL-XL) 100 mg 24 hr tablet TAKE 1 TABLET TWICE A  tablet 3    Multiple Vitamins-Minerals (MULTIVITAMIN ADULT) TABS every 24 hours      naproxen (NAPROSYN) 500 mg tablet Every 12 hours      niacin (NIASPAN) 500 mg CR tablet TAKE 4 TABLETS DAILY AT BEDTIME AFTER A LOW-FAT SNACK 360 tablet 2    nystatin-triamcinolone (MYCOLOG-II) cream Apply topically 3 (three) times a day      Omega-3 Fatty Acids (FISH OIL) 1,000 mg Take 1 capsule by mouth 2 (two) times a day      Omega-3 Fatty Acids (FISH OIL) 1,000 mg Take 2 capsules daily      potassium chloride (K-DUR,KLOR-CON) 20 mEq tablet TAKE 1 TABLET TWICE A DAY WITH FOOD 180 tablet 3    rosuvastatin (CRESTOR) 20 MG tablet Take 1 tablet by mouth daily      sitaGLIPtin (JANUVIA) 100 mg tablet Take 1 tablet by mouth daily      tamsulosin (FLOMAX) 0 4 mg every 24 hours      Vitamin D, Ergocalciferol, 2000 units CAPS take 1 Capsule by Oral route once       No current facility-administered medications for this visit        Allergies   Allergen Reactions    Iodine Other (See Comments)     "KIDNEY DYE"     Immunization History   Administered Date(s) Administered    Influenza 11/17/2017, 11/17/2017    Tdap 11/20/2015       Patient Care Team:  Daniel Arndt MD as PCP - General (Internal Medicine)  MD Mili Hill MD    Medicare Screening Tests and Risk Assessments:  Alonzo Campa is here for his Subsequent Wellness visit  Last Medicare Wellness visit information reviewed, patient interviewed and updates made to the record today  Health Risk Assessment:  Patient rates overall health as good  Patient feels that their physical health rating is Same  Eyesight was rated as Same  Hearing was rated as Same  Patient feels that their emotional and mental health rating is Same  Pain experienced by patient in the last 7 days has been Some  Patient's pain rating has been 6/10  Patient states that he has experienced no weight loss or gain in last 6 months  Emotional/Mental Health:  Patient has been feeling nervous/anxious  PHQ-9 Depression Screening:    Frequency of the following problems over the past two weeks:      1  Little interest or pleasure in doing things: 0 - not at all      2  Feeling down, depressed, or hopeless: 0 - not at all  PHQ-2 Score: 0          Broken Bones/Falls: Fall Risk Assessment:    In the past year, patient has experienced: No history of falling in past year          Bladder/Bowel:  Patient has leaked urine accidently in the last six months  Patient reports no loss of bowel control  Immunizations:  Patient has not had a flu vaccination within the last year  Patient has received a pneumonia shot  Patient has not received a shingles shot  Patient has received tetanus/diphtheria shot  Home Safety:  Patient has trouble with stairs inside or outside of their home  Patient currently reports that there are no safety hazards present in home, working smoke alarms, working carbon monoxide detectors  Preventative Screenings:   prostate cancer screen performed, colon cancer screen completed, cholesterol screen completed, glaucoma eye exam completed,     Nutrition:  Current diet: Regular with servings of the following:    Medications:  Patient is currently taking over-the-counter supplements  Patient is able to manage medications  Lifestyle Choices:  Patient reports no tobacco use    Patient has not smoked or used tobacco in the past   Patient reports no alcohol use  Patient drives a vehicle  Patient wears seat belt  Activities of Daily Living:  Can get out of bed by his or her self, able to dress self, able to make own meals, able to do own shopping, able to bathe self, can do own laundry/housekeeping, can manage own money, pay bills and track expenses    Previous Hospitalizations:  No hospitalization or ED visit in past 12 months        Advanced Directives:  Patient has decided on a power of   Patient has spoken to designated power of   Patient has completed advanced directive  No exam data present    Physical Exam:  Review of Systems   Gastrointestinal: Negative for bowel incontinence  Psychiatric/Behavioral: The patient is not nervous/anxious  Vitals:    01/10/19 1120   BP: 140/82   BP Location: Left arm   Patient Position: Sitting   Cuff Size: Large   Pulse: 64   Temp: 98 6 °F (37 °C)   TempSrc: Tympanic   SpO2: 96%   Weight: (!) 157 kg (346 lb 12 8 oz)   Height: 5' 10" (1 778 m)   Body mass index is 49 76 kg/m²      Physical Exam

## 2019-02-11 PROBLEM — E66.01 MORBID OBESITY WITH BMI OF 45.0-49.9, ADULT (HCC): Status: ACTIVE | Noted: 2019-02-11

## 2019-02-12 LAB
ALBUMIN SERPL-MCNC: 3.9 G/DL (ref 3.6–5.1)
ALBUMIN/CREAT UR: 2884 MCG/MG CREAT
ALBUMIN/GLOB SERPL: 1.4 (CALC) (ref 1–2.5)
ALP SERPL-CCNC: 57 U/L (ref 40–115)
ALT SERPL-CCNC: 24 U/L (ref 9–46)
AST SERPL-CCNC: 22 U/L (ref 10–35)
BILIRUB SERPL-MCNC: 0.6 MG/DL (ref 0.2–1.2)
BUN SERPL-MCNC: 38 MG/DL (ref 7–25)
BUN/CREAT SERPL: 25 (CALC) (ref 6–22)
CALCIUM SERPL-MCNC: 10.1 MG/DL (ref 8.6–10.3)
CHLORIDE SERPL-SCNC: 101 MMOL/L (ref 98–110)
CHOLEST SERPL-MCNC: 197 MG/DL
CHOLEST/HDLC SERPL: 4.9 (CALC)
CO2 SERPL-SCNC: 27 MMOL/L (ref 20–32)
CREAT SERPL-MCNC: 1.5 MG/DL (ref 0.7–1.25)
CREAT UR-MCNC: 140 MG/DL (ref 20–320)
GLOBULIN SER CALC-MCNC: 2.7 G/DL (CALC) (ref 1.9–3.7)
GLUCOSE SERPL-MCNC: 131 MG/DL (ref 65–99)
HBA1C MFR BLD: 10.6 % OF TOTAL HGB
HCV AB S/CO SERPL IA: 0.09
HCV AB SERPL QL IA: NORMAL
HDLC SERPL-MCNC: 40 MG/DL
LDLC SERPL CALC-MCNC: 125 MG/DL (CALC)
MICROALBUMIN UR-MCNC: 403.7 MG/DL
NONHDLC SERPL-MCNC: 157 MG/DL (CALC)
POTASSIUM SERPL-SCNC: 3.9 MMOL/L (ref 3.5–5.3)
PROT SERPL-MCNC: 6.6 G/DL (ref 6.1–8.1)
SL AMB EGFR AFRICAN AMERICAN: 56 ML/MIN/1.73M2
SL AMB EGFR NON AFRICAN AMERICAN: 49 ML/MIN/1.73M2
SODIUM SERPL-SCNC: 139 MMOL/L (ref 135–146)
TRIGL SERPL-MCNC: 206 MG/DL

## 2019-02-14 ENCOUNTER — TELEPHONE (OUTPATIENT)
Dept: GASTROENTEROLOGY | Facility: CLINIC | Age: 64
End: 2019-02-14

## 2019-02-14 ENCOUNTER — TELEPHONE (OUTPATIENT)
Dept: FAMILY MEDICINE CLINIC | Facility: CLINIC | Age: 64
End: 2019-02-14

## 2019-02-14 DIAGNOSIS — R80.9 PROTEINURIA, UNSPECIFIED TYPE: Primary | ICD-10-CM

## 2019-02-14 DIAGNOSIS — R73.09 ELEVATED HEMOGLOBIN A1C: ICD-10-CM

## 2019-02-14 NOTE — TELEPHONE ENCOUNTER
Ret'd call  Advised Pt's wife that colon w/ [address] was 10/21/15 and it had a 5-yr recall, due 10/21/2020  He has a primary appt tomorrow at same practice but w/ a new provider  She will provide info to new provider and if they need add'l info will CB w/ their fax # to have reports sent  She believes they have it because prev primary had copies but she wanted to make sure she had info for tomorrow

## 2019-02-14 NOTE — TELEPHONE ENCOUNTER
----- Message from Sharda Mclaughlin MD sent at 2/13/2019  6:02 PM EST -----  Cholesterol is still high should increase Crestor to 40 mg  Kidney functions still elevated  Vilas Organ Spending a lot of protein in the urine  Needs urgent nephrology appointment  [facility] be fine  Please take care of the consult herself  He also needs to see endocrinologist  hemoglobin A1c is up to 10 6    [facility] end[facility]inology

## 2019-02-14 NOTE — TELEPHONE ENCOUNTER
Pt's wife left VM mssg on Nrsg line asking when his last colon was?/thinks maybe 3 or 4 yrs ago; also asks when next colonoscopy is due; req [phone]

## 2019-02-15 ENCOUNTER — OFFICE VISIT (OUTPATIENT)
Dept: FAMILY MEDICINE CLINIC | Facility: CLINIC | Age: 64
End: 2019-02-15
Payer: COMMERCIAL

## 2019-02-15 VITALS
WEIGHT: 315 LBS | OXYGEN SATURATION: 95 % | HEART RATE: 60 BPM | SYSTOLIC BLOOD PRESSURE: 128 MMHG | HEIGHT: 70 IN | DIASTOLIC BLOOD PRESSURE: 68 MMHG | BODY MASS INDEX: 45.1 KG/M2

## 2019-02-15 DIAGNOSIS — G47.33 OBSTRUCTIVE SLEEP APNEA OF ADULT: ICD-10-CM

## 2019-02-15 DIAGNOSIS — N18.30 STAGE 3 CHRONIC KIDNEY DISEASE DUE TO TYPE 2 DIABETES MELLITUS (HCC): ICD-10-CM

## 2019-02-15 DIAGNOSIS — E11.22 STAGE 3 CHRONIC KIDNEY DISEASE DUE TO TYPE 2 DIABETES MELLITUS (HCC): ICD-10-CM

## 2019-02-15 DIAGNOSIS — E11.9 TYPE 2 DIABETES MELLITUS WITHOUT COMPLICATION, UNSPECIFIED WHETHER LONG TERM INSULIN USE (HCC): ICD-10-CM

## 2019-02-15 DIAGNOSIS — E78.2 MIXED HYPERLIPIDEMIA: ICD-10-CM

## 2019-02-15 DIAGNOSIS — Z23 ENCOUNTER FOR VACCINATION: Primary | ICD-10-CM

## 2019-02-15 DIAGNOSIS — R80.1 PERSISTENT PROTEINURIA: ICD-10-CM

## 2019-02-15 DIAGNOSIS — E66.01 MORBID OBESITY (HCC): ICD-10-CM

## 2019-02-15 PROCEDURE — 99213 OFFICE O/P EST LOW 20 MIN: CPT | Performed by: INTERNAL MEDICINE

## 2019-02-15 PROCEDURE — 1036F TOBACCO NON-USER: CPT | Performed by: INTERNAL MEDICINE

## 2019-02-15 PROCEDURE — 3008F BODY MASS INDEX DOCD: CPT | Performed by: INTERNAL MEDICINE

## 2019-02-15 NOTE — PROGRESS NOTES
Diabetic Foot Exam    Patient's shoes and socks removed  Right Foot/Ankle   Right Foot Inspection  Skin Exam: skin normal and skin intact no dry skin, no warmth, no callus, no erythema, no maceration, no abnormal color, no pre-ulcer, no ulcer and no callus                          Toe Exam: ROM and strength within normal limitsno swelling  Sensory       Monofilament testing: absent  Vascular    The right DP pulse is 1+  Left Foot/Ankle  Left Foot Inspection  Skin Exam: skin normal and skin intactno dry skin, no warmth, no erythema, no maceration, normal color, no pre-ulcer, no ulcer and no callus                         Toe Exam: ROM and strength within normal limitsno swelling                   Sensory       Monofilament: absent  Vascular    The left DP pulse is 1+  Assign Risk Category:  Deformity present; Loss of protective sensation;  No weak pulses

## 2019-02-15 NOTE — PROGRESS NOTES
Assessment/Plan:         Diagnoses and all orders for this visit:    Encounter for vaccination  -     influenza vaccine, [id number], quadrivalent, recombinant, PF, 0 5 mL, for patients 18 yr+ (FLUBLOK)    Type 2 diabetes mellitus without complication, unspecified whether long term insulin use ([facility] )  Uncontrolled blood sugar  -     Ambulatory referral to Endocrinology; Future    Persistent proteinuria  -     Ambulatory referral to Endocrinology; Future  -     Ambulatory referral to Nephrology; Future  Better glycemic profile  Continue with ARB  Stage 3 chronic kidney disease due to type 2 diabetes mellitus ([facility] )  -     Ambulatory referral to Nephrology; Future  Heavy proteinuria  Patient is on ARB  Caution against NSAID use  Morbid obesity ([facility] )  -  as above    Mixed hyperlipidemia  -     Ambulatory referral to Cardiology; Future  High risk coronary disease disease increase Crestor to 20 mg a day from 10 mg that he has been taking at home  Follow up with Cardiology  Obstructive sleep apnea of adult    continue to use CPAP machine  Subjective:      Patient ID: Emilie Dee is a 58 y o  male  HPI  Patient is here to follow-up on recent lab studies  His hemoglobin A1c is still at 10 3  Did improve 13  However he has been 2884 microalbumin/creat ratio  His creatinine is elevated at 1 50  LDL is also elevated at 127 he has been taking Crestor 10 mg a day  His triglycerides have improved from 206 improved from 418 in October  Blood pressure is good  He has sleep apnea uses CPAP machine regularly  The wife's testify that he does not snore when he is using CPAP machine  He does follow up regularly with his podiatrist   He will have an eye appointment soon  Does take B12 to help with his numbness and tingling in his lower extremities  He has chronic venous stasis and uses Goldy stockings regularly  He is also on Lasix  He also takes Neurontin  He does complain of feeling tired all the time    Denies any chest pain but shortness of breath with exertion  The following portions of the patient's history were reviewed and updated as appropriate: allergies, current medications, past medical history, past social history and problem list     Review of Systems   Constitutional: Positive for fatigue and unexpected weight change  Negative for chills and fever  Respiratory: Positive for shortness of breath  Negative for cough  Cardiovascular: Negative for chest pain, palpitations and leg swelling  Gastrointestinal: Negative for abdominal pain, blood in stool and constipation  Endocrine:        As above   Genitourinary: Positive for frequency  Musculoskeletal: Positive for arthralgias and back pain  Neurological: Positive for numbness  Negative for dizziness           Current Outpatient Medications:     allopurinol (ZYLOPRIM) 100 mg tablet, TAKE 1 TABLET TWICE A DAY, Disp: 180 tablet, Rfl: 3    B Complex Vitamins (B COMPLEX 1 PO), Take by mouth 2 (two) times a day  , Disp: , Rfl:     Cinnamon 500 MG TABS, Take by mouth 2 (two) times a day  , Disp: , Rfl:     colchicine (COLCRYS) 0 6 mg tablet, Every 12 hours, Disp: , Rfl:     cyanocobalamin (VITAMIN B-12) 100 mcg tablet, Take by mouth, Disp: , Rfl:     docusate sodium (COLACE) 100 mg capsule, every 24 hours, Disp: , Rfl:     fluticasone (FLONASE) 50 mcg/act nasal spray, inhale 2 spray by intranasal route  every day in each nostril as needed, Disp: , Rfl:     furosemide (LASIX) 20 mg tablet, TAKE 1 TABLET DAILY AS NEEDED FOR EDEMA, Disp: 90 tablet, Rfl: 1    gabapentin (NEURONTIN) 300 mg capsule, , Disp: , Rfl:     glimepiride (AMARYL) 4 mg tablet, , Disp: , Rfl:     Glucosamine Sulfate 500 MG TABS, Every 12 hours, Disp: , Rfl:     hydrochlorothiazide (HYDRODIURIL) 25 mg tablet, , Disp: , Rfl:     irbesartan (AVAPRO) 300 mg tablet, Take 1 tablet by mouth daily, Disp: , Rfl:     lansoprazole (PREVACID) 30 mg capsule, every 24 hours, Disp: , Rfl:   metFORMIN (GLUCOPHAGE) 1000 MG tablet, TAKE ONE TABLET BY MOUTH TWICE DAILY WITH MORNING AND EVENING MEALS, Disp: 90 tablet, Rfl: 0    metoprolol succinate (TOPROL-XL) 100 mg 24 hr tablet, TAKE 1 TABLET TWICE A DAY, Disp: 180 tablet, Rfl: 3    Multiple Vitamins-Minerals (MULTIVITAMIN ADULT) TABS, every 24 hours, Disp: , Rfl:     naproxen (NAPROSYN) 500 mg tablet, Every 12 hours, Disp: , Rfl:     niacin (NIASPAN) 500 mg CR tablet, TAKE 4 TABLETS DAILY AT BEDTIME AFTER A LOW-FAT SNACK, Disp: 360 tablet, Rfl: 2    nystatin-triamcinolone (MYCOLOG-II) cream, Apply topically 3 (three) times a day, Disp: , Rfl:     Omega-3 Fatty Acids (FISH OIL) 1,000 mg, Take 1 capsule by mouth 2 (two) times a day, Disp: , Rfl:     Omega-3 Fatty Acids (FISH OIL) 1,000 mg, Take 2 capsules daily, Disp: , Rfl:     potassium chloride (K-DUR,KLOR-CON) 20 mEq tablet, TAKE 1 TABLET TWICE A DAY WITH FOOD, Disp: 180 tablet, Rfl: 3    rosuvastatin (CRESTOR) 20 MG tablet, Take 1 tablet by mouth daily, Disp: , Rfl:     sitaGLIPtin (JANUVIA) 100 mg tablet, Take 1 tablet by mouth daily, Disp: , Rfl:     tamsulosin (FLOMAX) 0 4 mg, every 24 hours, Disp: , Rfl:     Vitamin D, Ergocalciferol, 2000 units CAPS, take 1 Capsule by Oral route once, Disp: , Rfl:   Objective:      /68 (BP Location: Left arm, Patient Position: Sitting, Cuff Size: Extra-Large)   Pulse 60   Ht 5' 10" (1 778 m)   Wt (!) 152 kg (335 lb)   SpO2 95%   BMI 48 07 kg/m²          Physical Exam   Constitutional:   Morbid obesity   Cardiovascular: Normal rate, regular rhythm and normal heart sounds  No murmur heard  Pulmonary/Chest: Effort normal and breath sounds normal  No stridor  No respiratory distress  He has no wheezes  Abdominal: Bowel sounds are normal  He exhibits no distension  There is no tenderness  There is no guarding  Obese nontender   Musculoskeletal: He exhibits edema (Chronic venous dermatosis)  Neurological: He is alert

## 2019-02-18 DIAGNOSIS — G62.9 NEUROPATHY: Primary | ICD-10-CM

## 2019-02-18 DIAGNOSIS — E11.9 TYPE 2 DIABETES MELLITUS WITHOUT COMPLICATION, WITHOUT LONG-TERM CURRENT USE OF INSULIN (HCC): ICD-10-CM

## 2019-02-18 DIAGNOSIS — E08.00 DIABETES MELLITUS DUE TO UNDERLYING CONDITION WITH HYPEROSMOLARITY WITHOUT COMA, WITHOUT LONG-TERM CURRENT USE OF INSULIN (HCC): Primary | ICD-10-CM

## 2019-02-19 DIAGNOSIS — I10 BENIGN ESSENTIAL HYPERTENSION: Primary | ICD-10-CM

## 2019-02-19 DIAGNOSIS — N40.0 BENIGN PROSTATIC HYPERPLASIA, UNSPECIFIED WHETHER LOWER URINARY TRACT SYMPTOMS PRESENT: ICD-10-CM

## 2019-02-19 RX ORDER — GLIMEPIRIDE 4 MG/1
TABLET ORAL
Qty: 90 TABLET | Refills: 2 | Status: SHIPPED | OUTPATIENT
Start: 2019-02-19 | End: 2021-01-28 | Stop reason: SDUPTHER

## 2019-02-19 RX ORDER — TAMSULOSIN HYDROCHLORIDE 0.4 MG/1
CAPSULE ORAL
Qty: 180 CAPSULE | Refills: 3 | Status: SHIPPED | OUTPATIENT
Start: 2019-02-19 | End: 2020-11-01

## 2019-02-19 RX ORDER — GABAPENTIN 300 MG/1
300 CAPSULE ORAL DAILY
Qty: 90 CAPSULE | Refills: 2 | Status: SHIPPED | OUTPATIENT
Start: 2019-02-19 | End: 2019-11-11 | Stop reason: SDUPTHER

## 2019-02-21 DIAGNOSIS — E78.2 MIXED HYPERLIPIDEMIA: Primary | ICD-10-CM

## 2019-02-22 RX ORDER — ROSUVASTATIN CALCIUM 20 MG/1
TABLET, COATED ORAL
Qty: 90 TABLET | Refills: 3 | Status: SHIPPED | OUTPATIENT
Start: 2019-02-22 | End: 2021-04-13

## 2019-05-14 LAB — HBA1C MFR BLD HPLC: 6.7 %

## 2019-11-11 DIAGNOSIS — G62.9 NEUROPATHY: ICD-10-CM

## 2019-11-11 DIAGNOSIS — M1A.9XX0 CHRONIC GOUT WITHOUT TOPHUS, UNSPECIFIED CAUSE, UNSPECIFIED SITE: ICD-10-CM

## 2019-11-11 DIAGNOSIS — I10 ESSENTIAL HYPERTENSION: ICD-10-CM

## 2019-11-11 RX ORDER — GABAPENTIN 300 MG/1
CAPSULE ORAL
Qty: 90 CAPSULE | Refills: 4 | Status: SHIPPED | OUTPATIENT
Start: 2019-11-11 | End: 2021-01-28 | Stop reason: SDUPTHER

## 2019-11-11 RX ORDER — ALLOPURINOL 100 MG/1
TABLET ORAL
Qty: 180 TABLET | Refills: 4 | Status: SHIPPED | OUTPATIENT
Start: 2019-11-11 | End: 2020-11-01

## 2019-11-11 RX ORDER — POTASSIUM CHLORIDE 20 MEQ/1
TABLET, EXTENDED RELEASE ORAL
Qty: 180 TABLET | Refills: 4 | Status: SHIPPED | OUTPATIENT
Start: 2019-11-11 | End: 2021-01-28 | Stop reason: SDUPTHER

## 2019-11-11 RX ORDER — METOPROLOL SUCCINATE 100 MG/1
TABLET, EXTENDED RELEASE ORAL
Qty: 180 TABLET | Refills: 4 | Status: SHIPPED | OUTPATIENT
Start: 2019-11-11 | End: 2021-01-28

## 2020-03-10 ENCOUNTER — NURSE TRIAGE (OUTPATIENT)
Dept: FAMILY MEDICINE CLINIC | Facility: CLINIC | Age: 65
End: 2020-03-10

## 2020-03-10 NOTE — PROGRESS NOTES
Upon review of the In Basket request we were able to locate, review, and update the patient chart as requested for Hemoglobin A1c  Any additional questions or concerns should be emailed to the Practice Liaisons via [email or web address] email, please do not reply via In Basket      Thank you  Tamika Kwong

## 2020-03-17 ENCOUNTER — TELEPHONE (OUTPATIENT)
Dept: FAMILY MEDICINE CLINIC | Facility: CLINIC | Age: 65
End: 2020-03-17

## 2020-03-17 NOTE — TELEPHONE ENCOUNTER
----- Message from Ilda Stoll MA sent at 3/13/2020  9:20 AM EDT -----  This pt has not been seen in our office for the past year and is in need for an Annual Phy  Please call to make an appointment for them  After 3 attempts please send back to [place] [phone] and then she will send letter  If found that the pt has moved or est care somewhere else please provide new address or PCP name to [place] [phone]  If the pt refuses to provide new info please document that  Directions to turn this in to Telephone Call:   Click Telephone Call button In task   When asked if you want to copy the message text to the encounter note, select yes   For all attemps made to contact the pt please make sure you are using the contact info tab in the Telephone Call encounter to document  Until 3 attempts are made please rout the msg to the [address] [place] [address]  After 3 attempts then send to [place] [phone]

## 2020-03-31 NOTE — TELEPHONE ENCOUNTER
03/31/20 8:59 AM     Thank you for your request  Your request has been received, reviewed, and the patient chart updated  The PCP has successfully been removed with a patient attribution note  This message will now be completed      Thank you  Robbie Yancey

## 2020-10-26 LAB — HBA1C MFR BLD HPLC: 6.5 %

## 2020-10-26 PROCEDURE — 3044F HG A1C LEVEL LT 7.0%: CPT | Performed by: INTERNAL MEDICINE

## 2020-11-01 PROBLEM — I48.91 ATRIAL FIBRILLATION (HCC): Status: ACTIVE | Noted: 2019-08-07

## 2020-11-01 RX ORDER — ASPIRIN 325 MG
TABLET ORAL EVERY 24 HOURS
COMMUNITY
End: 2020-11-02

## 2020-11-01 RX ORDER — HYDROCHLOROTHIAZIDE 25 MG/1
TABLET ORAL EVERY 24 HOURS
COMMUNITY
End: 2020-11-01

## 2020-11-01 RX ORDER — GABAPENTIN 250 MG/5ML
SOLUTION ORAL
COMMUNITY
End: 2020-11-01

## 2020-11-01 RX ORDER — ALLOPURINOL 100 MG/1
TABLET ORAL EVERY 12 HOURS
COMMUNITY
End: 2021-03-09 | Stop reason: SDUPTHER

## 2020-11-02 ENCOUNTER — OFFICE VISIT (OUTPATIENT)
Dept: INTERNAL MEDICINE CLINIC | Facility: CLINIC | Age: 65
End: 2020-11-02
Payer: COMMERCIAL

## 2020-11-02 VITALS
SYSTOLIC BLOOD PRESSURE: 120 MMHG | HEART RATE: 80 BPM | HEIGHT: 70 IN | DIASTOLIC BLOOD PRESSURE: 62 MMHG | TEMPERATURE: 98.3 F | BODY MASS INDEX: 45.1 KG/M2 | WEIGHT: 315 LBS

## 2020-11-02 DIAGNOSIS — E11.21 TYPE 2 DIABETES MELLITUS WITH DIABETIC NEPHROPATHY, WITHOUT LONG-TERM CURRENT USE OF INSULIN (HCC): Primary | ICD-10-CM

## 2020-11-02 PROCEDURE — 3008F BODY MASS INDEX DOCD: CPT | Performed by: INTERNAL MEDICINE

## 2020-11-02 PROCEDURE — 99214 OFFICE O/P EST MOD 30 MIN: CPT | Performed by: INTERNAL MEDICINE

## 2020-11-02 PROCEDURE — 3074F SYST BP LT 130 MM HG: CPT | Performed by: INTERNAL MEDICINE

## 2020-11-02 PROCEDURE — 3078F DIAST BP <80 MM HG: CPT | Performed by: INTERNAL MEDICINE

## 2020-11-02 PROCEDURE — 1160F RVW MEDS BY RX/DR IN RCRD: CPT | Performed by: INTERNAL MEDICINE

## 2020-11-02 PROCEDURE — 1036F TOBACCO NON-USER: CPT | Performed by: INTERNAL MEDICINE

## 2020-11-02 PROCEDURE — 3066F NEPHROPATHY DOC TX: CPT | Performed by: INTERNAL MEDICINE

## 2020-11-02 RX ORDER — LOSARTAN POTASSIUM 100 MG/1
25 TABLET ORAL DAILY
COMMUNITY

## 2020-12-04 ENCOUNTER — TELEPHONE (OUTPATIENT)
Dept: INTERNAL MEDICINE CLINIC | Facility: CLINIC | Age: 65
End: 2020-12-04

## 2020-12-04 DIAGNOSIS — N28.1 RENAL CYST: Primary | ICD-10-CM

## 2020-12-04 DIAGNOSIS — R91.1 PULMONARY NODULE: ICD-10-CM

## 2020-12-04 DIAGNOSIS — E04.1 THYROID NODULE: ICD-10-CM

## 2021-01-28 ENCOUNTER — TELEPHONE (OUTPATIENT)
Dept: INTERNAL MEDICINE CLINIC | Facility: CLINIC | Age: 66
End: 2021-01-28

## 2021-01-28 ENCOUNTER — OFFICE VISIT (OUTPATIENT)
Dept: INTERNAL MEDICINE CLINIC | Facility: CLINIC | Age: 66
End: 2021-01-28
Payer: COMMERCIAL

## 2021-01-28 VITALS
HEIGHT: 70 IN | TEMPERATURE: 97 F | BODY MASS INDEX: 45.1 KG/M2 | HEART RATE: 80 BPM | WEIGHT: 315 LBS | DIASTOLIC BLOOD PRESSURE: 80 MMHG | SYSTOLIC BLOOD PRESSURE: 142 MMHG

## 2021-01-28 DIAGNOSIS — G62.9 NEUROPATHY: ICD-10-CM

## 2021-01-28 DIAGNOSIS — I48.91 ATRIAL FIBRILLATION, UNSPECIFIED TYPE (HCC): ICD-10-CM

## 2021-01-28 DIAGNOSIS — E11.22 STAGE 3 CHRONIC KIDNEY DISEASE DUE TO TYPE 2 DIABETES MELLITUS (HCC): Primary | ICD-10-CM

## 2021-01-28 DIAGNOSIS — R80.9 PROTEINURIA, UNSPECIFIED TYPE: ICD-10-CM

## 2021-01-28 DIAGNOSIS — E11.9 TYPE 2 DIABETES MELLITUS WITHOUT COMPLICATION, WITHOUT LONG-TERM CURRENT USE OF INSULIN (HCC): ICD-10-CM

## 2021-01-28 DIAGNOSIS — N28.1 KIDNEY CYSTS: ICD-10-CM

## 2021-01-28 DIAGNOSIS — E04.1 THYROID NODULE: ICD-10-CM

## 2021-01-28 DIAGNOSIS — N40.1 BENIGN PROSTATIC HYPERPLASIA WITH LOWER URINARY TRACT SYMPTOMS, SYMPTOM DETAILS UNSPECIFIED: ICD-10-CM

## 2021-01-28 DIAGNOSIS — I10 ESSENTIAL HYPERTENSION: ICD-10-CM

## 2021-01-28 DIAGNOSIS — E08.00 DIABETES MELLITUS DUE TO UNDERLYING CONDITION WITH HYPEROSMOLARITY WITHOUT COMA, WITHOUT LONG-TERM CURRENT USE OF INSULIN (HCC): ICD-10-CM

## 2021-01-28 DIAGNOSIS — N18.30 STAGE 3 CHRONIC KIDNEY DISEASE DUE TO TYPE 2 DIABETES MELLITUS (HCC): Primary | ICD-10-CM

## 2021-01-28 PROCEDURE — 99215 OFFICE O/P EST HI 40 MIN: CPT | Performed by: INTERNAL MEDICINE

## 2021-01-28 PROCEDURE — 1036F TOBACCO NON-USER: CPT | Performed by: INTERNAL MEDICINE

## 2021-01-28 PROCEDURE — 3008F BODY MASS INDEX DOCD: CPT | Performed by: INTERNAL MEDICINE

## 2021-01-28 PROCEDURE — 3077F SYST BP >= 140 MM HG: CPT | Performed by: INTERNAL MEDICINE

## 2021-01-28 PROCEDURE — 3066F NEPHROPATHY DOC TX: CPT | Performed by: INTERNAL MEDICINE

## 2021-01-28 PROCEDURE — 1160F RVW MEDS BY RX/DR IN RCRD: CPT | Performed by: INTERNAL MEDICINE

## 2021-01-28 PROCEDURE — 3079F DIAST BP 80-89 MM HG: CPT | Performed by: INTERNAL MEDICINE

## 2021-01-28 PROCEDURE — 4010F ACE/ARB THERAPY RXD/TAKEN: CPT | Performed by: INTERNAL MEDICINE

## 2021-01-28 RX ORDER — TAMSULOSIN HYDROCHLORIDE 0.4 MG/1
0.4 CAPSULE ORAL EVERY 24 HOURS
Qty: 90 CAPSULE | Refills: 2 | Status: SHIPPED | OUTPATIENT
Start: 2021-01-28 | End: 2021-08-11

## 2021-01-28 RX ORDER — POTASSIUM CHLORIDE 20 MEQ/1
20 TABLET, EXTENDED RELEASE ORAL 2 TIMES DAILY WITH MEALS
Qty: 180 TABLET | Refills: 4 | Status: SHIPPED | OUTPATIENT
Start: 2021-01-28 | End: 2021-01-28 | Stop reason: SDUPTHER

## 2021-01-28 RX ORDER — GLIMEPIRIDE 4 MG/1
6 TABLET ORAL DAILY
Qty: 90 TABLET | Refills: 2 | Status: SHIPPED | OUTPATIENT
Start: 2021-01-28 | End: 2021-08-11

## 2021-01-28 RX ORDER — POTASSIUM CHLORIDE 20 MEQ/1
20 TABLET, EXTENDED RELEASE ORAL 2 TIMES DAILY WITH MEALS
Qty: 180 TABLET | Refills: 4 | Status: SHIPPED | OUTPATIENT
Start: 2021-01-28 | End: 2021-08-11 | Stop reason: ALTCHOICE

## 2021-01-28 RX ORDER — TAMSULOSIN HYDROCHLORIDE 0.4 MG/1
0.4 CAPSULE ORAL EVERY 24 HOURS
Qty: 30 CAPSULE | Refills: 4 | Status: SHIPPED | OUTPATIENT
Start: 2021-01-28 | End: 2021-01-28 | Stop reason: SDUPTHER

## 2021-01-28 RX ORDER — DIGOXIN 125 MCG
125 TABLET ORAL DAILY
COMMUNITY

## 2021-01-28 RX ORDER — METOPROLOL SUCCINATE 25 MG/1
25 TABLET, EXTENDED RELEASE ORAL DAILY
COMMUNITY

## 2021-01-28 RX ORDER — GABAPENTIN 300 MG/1
300 CAPSULE ORAL DAILY
Qty: 90 CAPSULE | Refills: 4 | Status: SHIPPED | OUTPATIENT
Start: 2021-01-28 | End: 2021-01-28 | Stop reason: SDUPTHER

## 2021-01-28 RX ORDER — LISINOPRIL 2.5 MG/1
2.5 TABLET ORAL DAILY
Qty: 30 TABLET | Refills: 2 | Status: SHIPPED | OUTPATIENT
Start: 2021-01-28 | End: 2021-08-11 | Stop reason: ALTCHOICE

## 2021-01-28 RX ORDER — GABAPENTIN 300 MG/1
300 CAPSULE ORAL DAILY
Qty: 90 CAPSULE | Refills: 4 | Status: SHIPPED | OUTPATIENT
Start: 2021-01-28

## 2021-01-28 NOTE — PROGRESS NOTES
BMI Counseling: Body mass index is 46 2 kg/m²  The BMI is above normal  Nutrition recommendations include decreasing portion sizes  Exercise recommendations include exercising 3-5 times per week  Patient referred to PCP due to patient being overweight  Assessment/Plan:    No problem-specific Assessment & Plan notes found for this encounter  Diagnoses and all orders for this visit:    Stage 3 chronic kidney disease due to type 2 diabetes mellitus ([facility] [address] )    Type 2 diabetes mellitus without complication, without long-term current use of insulin ([facility])    Atrial fibrillation, unspecified type [facility])    Essential hypertension    Thyroid nodule  -     US guided thyroid biopsy; Future    Proteinuria, unspecified type  -     Renal function panel; Future  -     lisinopril (ZESTRIL) 2 5 mg tablet; Take 1 tablet (2 5 mg total) by mouth daily    Kidney cysts    Diabetes mellitus due to underlying condition with hyperosmolarity without coma, without long-term current use of insulin ([facility])  -     glimepiride (AMARYL) 4 mg tablet; Take 1 5 tablets (6 mg total) by mouth daily  -     Hemoglobin A1C; Future    Other orders  -     metoprolol succinate (TOPROL-XL) 25 mg 24 hr tablet; Take 25 mg by mouth daily  -     digoxin (LANOXIN) 0 125 mg tablet; Take 125 mcg by mouth daily          Subjective:      Patient ID: Gayle Andersen is a 76 y o  male  Has ablation Dr PEARCE  3/2/2021  labbs and renal us  Reviewed  meds reviewed  Nephrology 2/3/2021 visit  Off multaq not working  Cardioversion no help  Still on eliquis   home 240-150 off metformin  Us thyroid+ bx--seeing endo april      The following portions of the patient's history were reviewed and updated as appropriate: allergies, current medications, past family history, past medical history, past social history, past surgical history, and problem list     Review of Systems   Constitutional: Negative for activity change and fatigue     HENT: Negative for ear discharge, ear pain, rhinorrhea and sore throat  Eyes: Negative for pain and visual disturbance  Respiratory: Negative for cough and shortness of breath  Cardiovascular: Negative for chest pain and leg swelling  Gastrointestinal: Negative for abdominal pain, constipation and diarrhea  Endocrine: Negative for cold intolerance and polyuria  Genitourinary: Negative for flank pain and hematuria  Musculoskeletal: Negative for back pain and joint swelling  Skin: Negative for pallor and wound  Neurological: Negative for dizziness, seizures and speech difficulty  Psychiatric/Behavioral: Negative for confusion and hallucinations  Objective:      /80   Pulse 80   Temp (!) 97 °F (36 1 °C)   Ht 5' 10" (1 778 m)   Wt (!) 146 kg (322 lb)   BMI 46 20 kg/m²          Physical Exam  Vitals signs and nursing note reviewed  Constitutional:       General: He is not in acute distress  Appearance: Normal appearance  He is not ill-appearing  HENT:      Head: Normocephalic  Right Ear: External ear normal  There is no impacted cerumen  Left Ear: External ear normal  There is no impacted cerumen  Nose: No congestion or rhinorrhea  Mouth/Throat:      Pharynx: No posterior oropharyngeal erythema  Eyes:      General: No scleral icterus  Right eye: No discharge  Left eye: No discharge  Neck:      Musculoskeletal: No neck rigidity  Vascular: No carotid bruit  Cardiovascular:      Rate and Rhythm: Normal rate and regular rhythm  Heart sounds: Normal heart sounds  No murmur  No friction rub  No gallop  Pulmonary:      Breath sounds: No wheezing or rhonchi  Abdominal:      General: There is no distension  Tenderness: There is no abdominal tenderness  There is no guarding  Musculoskeletal:         General: No swelling  Right lower leg: No edema  Left lower leg: No edema  Lymphadenopathy:      Cervical: No cervical adenopathy     Skin: Coloration: Skin is not jaundiced  Neurological:      Mental Status: He is alert  Cranial Nerves: No cranial nerve deficit  Motor: No weakness        Coordination: Coordination normal    Psychiatric:         Mood and Affect: Mood normal

## 2021-01-28 NOTE — TELEPHONE ENCOUNTER
He forgot to tell you he had the first dose of covid vaccine jan 8 and has the 2nd scheduled for feb 8

## 2021-03-06 DIAGNOSIS — E11.9 TYPE 2 DIABETES MELLITUS WITHOUT COMPLICATION, WITHOUT LONG-TERM CURRENT USE OF INSULIN (HCC): Primary | ICD-10-CM

## 2021-03-06 DIAGNOSIS — K21.9 GASTROESOPHAGEAL REFLUX DISEASE WITHOUT ESOPHAGITIS: ICD-10-CM

## 2021-03-06 RX ORDER — LANSOPRAZOLE 30 MG/1
CAPSULE, DELAYED RELEASE ORAL
Qty: 90 CAPSULE | Refills: 3 | Status: SHIPPED | OUTPATIENT
Start: 2021-03-06

## 2021-03-06 RX ORDER — SITAGLIPTIN 100 MG/1
TABLET, FILM COATED ORAL
Qty: 90 TABLET | Refills: 3 | Status: SHIPPED | OUTPATIENT
Start: 2021-03-06 | End: 2021-08-11 | Stop reason: ALTCHOICE

## 2021-03-09 DIAGNOSIS — M10.9 GOUT, UNSPECIFIED CAUSE, UNSPECIFIED CHRONICITY, UNSPECIFIED SITE: Primary | ICD-10-CM

## 2021-03-09 RX ORDER — ALLOPURINOL 100 MG/1
100 TABLET ORAL 2 TIMES DAILY
Qty: 180 TABLET | Refills: 1 | Status: SHIPPED | OUTPATIENT
Start: 2021-03-09 | End: 2021-03-30 | Stop reason: SDUPTHER

## 2021-03-30 ENCOUNTER — TELEPHONE (OUTPATIENT)
Dept: INTERNAL MEDICINE CLINIC | Facility: CLINIC | Age: 66
End: 2021-03-30

## 2021-03-30 DIAGNOSIS — M10.9 GOUT, UNSPECIFIED CAUSE, UNSPECIFIED CHRONICITY, UNSPECIFIED SITE: ICD-10-CM

## 2021-03-30 RX ORDER — ALLOPURINOL 100 MG/1
100 TABLET ORAL 2 TIMES DAILY
Qty: 28 TABLET | Refills: 0 | Status: SHIPPED | OUTPATIENT
Start: 2021-03-30 | End: 2021-04-13 | Stop reason: SDUPTHER

## 2021-04-12 DIAGNOSIS — E78.2 MIXED HYPERLIPIDEMIA: ICD-10-CM

## 2021-04-13 DIAGNOSIS — M10.9 GOUT, UNSPECIFIED CAUSE, UNSPECIFIED CHRONICITY, UNSPECIFIED SITE: ICD-10-CM

## 2021-04-13 RX ORDER — ROSUVASTATIN CALCIUM 20 MG/1
TABLET, COATED ORAL
Qty: 90 TABLET | Refills: 3 | Status: SHIPPED | OUTPATIENT
Start: 2021-04-13

## 2021-04-13 RX ORDER — ALLOPURINOL 100 MG/1
100 TABLET ORAL 2 TIMES DAILY
Qty: 180 TABLET | Refills: 3 | Status: SHIPPED | OUTPATIENT
Start: 2021-04-13 | End: 2021-08-11

## 2021-05-25 ENCOUNTER — TELEPHONE (OUTPATIENT)
Dept: ADMINISTRATIVE | Facility: OTHER | Age: 66
End: 2021-05-25

## 2021-05-25 NOTE — TELEPHONE ENCOUNTER
----- Message from Indu Iglesias sent at 5/25/2021 11:11 AM EDT -----  05/25/21 11:11 AM    Hello, our patient Briana Rios has had CRC: Colonoscopy completed/performed  Please assist in updating the patient chart by pulling the document from encounters Tab within Chart Review  The date of service is 10/21/2015 in encounters dated 10/24/2015       Thank you,  Jane Little [facility]

## 2021-05-26 NOTE — TELEPHONE ENCOUNTER
Upon review of the In Basket request we were able to locate, review, and update the patient chart as requested for CRC: Colonoscopy  Any additional questions or concerns should be emailed to the Practice Liaisons via [email or web address]  [email or web address] email, please do not reply via In Basket      Thank you  Nuno Perkins

## 2021-06-03 ENCOUNTER — TELEPHONE (OUTPATIENT)
Dept: INTERNAL MEDICINE CLINIC | Facility: CLINIC | Age: 66
End: 2021-06-03

## 2021-06-03 NOTE — TELEPHONE ENCOUNTER
Called patient to make f/u apt to hospital confinement  S/w yajaira, wife, patient is switching to their daughters physican who is right down the road from them, he has an apt to see that doctor

## 2021-06-04 ENCOUNTER — TELEPHONE (OUTPATIENT)
Dept: ADMINISTRATIVE | Facility: OTHER | Age: 66
End: 2021-06-04

## 2021-06-04 NOTE — TELEPHONE ENCOUNTER
----- Message from Lizabeth Myers sent at 6/3/2021 10:36 AM EDT -----  Regarding: patient attbrution  See note in chart, s/w patient wife, she informed me that the patient is switching to another pcp,their daughters pcp,  he already has an apt to see the new physician, please remove Dr Lb Graf as his pcp

## 2021-08-11 ENCOUNTER — OFFICE VISIT (OUTPATIENT)
Dept: GASTROENTEROLOGY | Facility: CLINIC | Age: 66
End: 2021-08-11
Payer: COMMERCIAL

## 2021-08-11 VITALS
HEART RATE: 56 BPM | WEIGHT: 273 LBS | BODY MASS INDEX: 39.08 KG/M2 | DIASTOLIC BLOOD PRESSURE: 70 MMHG | HEIGHT: 70 IN | SYSTOLIC BLOOD PRESSURE: 114 MMHG

## 2021-08-11 DIAGNOSIS — N18.30 STAGE 3 CHRONIC KIDNEY DISEASE DUE TO TYPE 2 DIABETES MELLITUS (HCC): ICD-10-CM

## 2021-08-11 DIAGNOSIS — G47.33 OBSTRUCTIVE SLEEP APNEA OF ADULT: ICD-10-CM

## 2021-08-11 DIAGNOSIS — E11.22 STAGE 3 CHRONIC KIDNEY DISEASE DUE TO TYPE 2 DIABETES MELLITUS (HCC): ICD-10-CM

## 2021-08-11 DIAGNOSIS — I48.91 ATRIAL FIBRILLATION, UNSPECIFIED TYPE (HCC): ICD-10-CM

## 2021-08-11 DIAGNOSIS — D63.1 ANEMIA DUE TO STAGE 3 CHRONIC KIDNEY DISEASE, UNSPECIFIED WHETHER STAGE 3A OR 3B CKD (HCC): ICD-10-CM

## 2021-08-11 DIAGNOSIS — N18.30 ANEMIA DUE TO STAGE 3 CHRONIC KIDNEY DISEASE, UNSPECIFIED WHETHER STAGE 3A OR 3B CKD (HCC): ICD-10-CM

## 2021-08-11 DIAGNOSIS — K21.9 GASTROESOPHAGEAL REFLUX DISEASE WITHOUT ESOPHAGITIS: ICD-10-CM

## 2021-08-11 DIAGNOSIS — Z86.010 HISTORY OF COLON POLYPS: Primary | ICD-10-CM

## 2021-08-11 PROCEDURE — 99204 OFFICE O/P NEW MOD 45 MIN: CPT | Performed by: REGISTERED NURSE

## 2021-08-11 PROCEDURE — 3066F NEPHROPATHY DOC TX: CPT | Performed by: REGISTERED NURSE

## 2021-08-11 PROCEDURE — 1036F TOBACCO NON-USER: CPT | Performed by: REGISTERED NURSE

## 2021-08-11 PROCEDURE — 1160F RVW MEDS BY RX/DR IN RCRD: CPT | Performed by: REGISTERED NURSE

## 2021-08-11 PROCEDURE — 3008F BODY MASS INDEX DOCD: CPT | Performed by: REGISTERED NURSE

## 2021-08-11 RX ORDER — AMIODARONE HYDROCHLORIDE 200 MG/1
200 TABLET ORAL DAILY
COMMUNITY

## 2021-08-11 RX ORDER — TORSEMIDE 20 MG/1
20 TABLET ORAL DAILY
COMMUNITY

## 2021-08-11 RX ORDER — CEPHALEXIN 500 MG/1
500 CAPSULE ORAL 2 TIMES DAILY
COMMUNITY

## 2021-08-11 RX ORDER — ICOSAPENT ETHYL 1000 MG/1
2 CAPSULE ORAL 2 TIMES DAILY
COMMUNITY

## 2021-08-11 RX ORDER — SPIRONOLACTONE 25 MG/1
25 TABLET ORAL DAILY
COMMUNITY

## 2021-08-11 RX ORDER — CHOLECALCIFEROL (VITAMIN D3) 50 MCG
CAPSULE ORAL 2 TIMES DAILY
COMMUNITY

## 2021-08-11 NOTE — PROGRESS NOTES
[id number] - Outpatient Consultation  Qasim Gomez 78 y o  male MRN: [id number]  Encounter: [id number]    ASSESSMENT AND PLAN:      1  History of colon polyps  Last colonoscopy October 2015  Ascending colon polyp with no evidence of high-grade disease lesion or malignancy seen  Patient on a 5 year recall     -schedule colonoscopy to be done at [facility]-  Pending cardiology clearance  On Eliquis for Afib  Discussed the small risk thromboembolic event while holding the Eliquis 2 days prior to procedure  The patient understands the risks and benefits of holding the medication and agreed to proceed pending approval of the prescribing provider  2  Gastroesophageal reflux disease without esophagitis    Well controlled on the Pepcid 30 mg daily  No upper GI complaints  Patient has been intentionally losing weight    -  Continue Pepcid  -  Continue reflux diet  -  Continue gentle weight and lifestyle    3  Atrial fibrillation, unspecified type [facility])   status post ablation March 2021  Patient taking amiodarone, metoprolol, digoxin, Eliquis    - need cardiac clearance to stop Eliquis for 2 days for colonoscopy    4  Stage 3 chronic kidney disease due to type 2 diabetes mellitus ([place] [address] )  Has an appointment next month for a consult for a dialysis fistula  Patient was referred to [address] [place] [address]'s Renal Care Coordinator program  And had initial appointment to discuss possible kidney transplant  5  Obstructive sleep apnea of adult   Wears CPAP at night  6  Anemia due to stage 3 chronic kidney disease, unspecified whether stage 3a or 3b CKD ([facility] )   Patient was started on iron tablets due to anemia  Hemoglobin has improved to 11 3 from 9 9  iron saturation 16%, all other iron studies within normal limits at this time        Followup Appointment: Colonoscopy  ______________________________________________________________________    Chief Complaint   Patient presents with   Stacey Ruiz Clearance for colonosocpy Pt is on Eliquis       HPI:   Nadine Horton is a 78y o  year old male   With an extensive medical history presents for  Colonoscopy clearance and is on Eliquis  He had a cardiac ablation for Afib back in 2021  He has chronic kidney disease and has been consulted for potential dialysis catheter  He is also undergoing a kidney transplant workup  He denies any change in bowel habits, diarrhea, constipation, blood in stool  His stools had darkened since he started taking iron pills but he has not noted any blood  Denies any upper GI symptoms  No alarm symptoms  Last colonoscopy was in  with 1 polyp removed  He was on a 5 year recall but because of COVID was not comfortable coming before now  Was told by the Kidney Transplant Team that he needs to have his colonoscopy done for clearance  Was noted to have anemia,  likely related to chronic kidney disease and was started on iron tablets  Historical Information   Past Medical History:   Diagnosis Date    Asymptomatic proteinuria     Atrial fibrillation (HCC)     Atrial tachycardia (HCC)     Back muscle spasm     Colon polyp     Diabetes insipidus ([address] )     Diabetes mellitus ([address] )     Edema     GERD (gastroesophageal reflux disease)     Gout     High cholesterol     Hyperopic astigmatism     Hypertension     Left ventricular enlargement     Morbid obesity (HCC)     PFO (patent foramen ovale)     Renal insufficiency     Rotator cuff arthropathy     Sleep apnea      Past Surgical History:   Procedure Laterality Date    COLONOSCOPY       Social History     Substance and Sexual Activity   Alcohol Use No     Social History     Substance and Sexual Activity   Drug Use No     Social History     Tobacco Use   Smoking Status Former Smoker    Quit date:     Years since quittin 6   Smokeless Tobacco Never Used   Tobacco Comment    no passive smoke exposure  Roman Souza [employer], former cigarette smoker who quit in 01/01/2004     Family History   Problem Relation Age of Onset    Colon polyps Mother     Colon cancer Mother     No Known Problems Father     Hypertension Family     Diabetes Family        Meds/Allergies     Current Outpatient Medications:     allopurinol (ZYLOPRIM) 100 mg tablet    amiodarone 200 mg tablet    apixaban (Eliquis) 5 mg    cephalexin (KEFLEX) 500 mg capsule    Cholecalciferol (HM Vitamin D3) 50 MCG (2000 UT) CAPS    cyanocobalamin (VITAMIN B-12) 100 mcg tablet    digoxin (LANOXIN) 0 125 mg tablet    gabapentin (NEURONTIN) 300 mg capsule    glimepiride (AMARYL) 4 mg tablet    Glucosamine HCl-MSM (GLUCOSAMINE-MSM PO)    Icosapent Ethyl (Vascepa) 1 g CAPS    Insulin Degludec 100 UNIT/ML SOLN    insulin lispro (HumaLOG) 100 units/mL injection    lansoprazole (PREVACID) 30 mg capsule    losartan (COZAAR) 100 MG tablet    metoprolol succinate (TOPROL-XL) 25 mg 24 hr tablet    rosuvastatin (CRESTOR) 20 MG tablet    spironolactone (ALDACTONE) 25 mg tablet    tamsulosin (FLOMAX) 0 4 mg    torsemide (DEMADEX) 20 mg tablet    Multiple Vitamins-Minerals (MULTIVITAMIN ADULT) TABS    nystatin-triamcinolone (MYCOLOG-II) cream    Omega-3 Fatty Acids (FISH OIL) 1,000 mg    Allergies   Allergen Reactions    Iodinated Diagnostic Agents Hives    Iodine - Food Allergy Other (See Comments)     "KIDNEY DYE"       PHYSICAL EXAM:    Blood pressure 114/70, pulse 56, height 5' 10" (1 778 m), weight 124 kg (273 lb)  Body mass index is 39 17 kg/m²  General Appearance: NAD, cooperative, alert  Eyes: Anicteric  ENT:  Normocephalic, atraumatic, normal mucosa  Neck:  Supple, symmetrical, trachea midline,   Resp:  Clear to auscultation bilaterally; no rales, rhonchi or wheezing; respirations unlabored   CV:  S1 S2, Regular rate and rhythm; no murmur, rub, or gallop    GI:  Soft, non-tender, non-distended; normal bowel sounds; no masses, no organomegaly   Rectal: Deferred  Musculoskeletal: No cyanosis, clubbing or edema  Normal ROM  Skin:  No jaundice, rashes, or lesions   Heme/Lymph: No palpable cervical lymphadenopathy  Psych: Normal affect, good eye contact  Neuro: No gross deficits, AAOx3    Lab Results:   Lab Results   Component Value Date    WBC 6 7 10/29/2018    HGB 13 7 10/29/2018    HCT 41 2 10/29/2018    MCV 88 2 10/29/2018     10/29/2018     Lab Results   Component Value Date    K 3 9 02/11/2019     02/11/2019    CO2 27 02/11/2019    BUN 38 (H) 02/11/2019    CREATININE 1 50 (H) 02/11/2019    CALCIUM 10 1 02/11/2019    AST 22 02/11/2019    ALT 24 02/11/2019    ALKPHOS 57 02/11/2019     No results found for: IRON, TIBC, FERRITIN  No results found for: LIPASE    Radiology Results:   No results found  REVIEW OF SYSTEMS:    CONSTITUTIONAL: Denies any fever, chills, rigors, and unintentional weight loss  HEENT: No earache or tinnitus  Denies hearing loss or visual disturbances  CARDIOVASCULAR: No chest pain or palpitations  RESPIRATORY: Denies any cough, hemoptysis, shortness of breath or dyspnea on exertion  GASTROINTESTINAL: As noted in the History of Present Illness  GENITOURINARY: No problems with urination  Denies any hematuria or dysuria  NEUROLOGIC: No dizziness or vertigo, denies headaches  MUSCULOSKELETAL: Denies any muscle or joint pain  SKIN: Denies skin rashes or itching  ENDOCRINE: Denies excessive thirst  Denies intolerance to heat or cold  PSYCHOSOCIAL: Denies depression or anxiety  Denies any recent memory loss

## 2021-08-11 NOTE — TELEPHONE ENCOUNTER
Miralax Prep reviewed with patiient and wife  Diabetic instructions reviewed  Copies given to patient       Clearance for Eliquis prior to colonoscopy  Paperwork to Radha Morrow

## 2021-09-21 ENCOUNTER — TELEPHONE (OUTPATIENT)
Dept: GASTROENTEROLOGY | Facility: CLINIC | Age: 66
End: 2021-09-21

## 2021-09-21 NOTE — TELEPHONE ENCOUNTER
Wife left message pt had colonoscopy 8/31/2021 ad they have not heard back re path results  Please advise     [phone] [facility]

## 2022-01-10 ENCOUNTER — TELEPHONE (OUTPATIENT)
Dept: GASTROENTEROLOGY | Facility: CLINIC | Age: 67
End: 2022-01-10

## 2022-01-10 NOTE — TELEPHONE ENCOUNTER
Pt's daughter, Joselyn Martinez, left [facility] [place] requesting records from colonoscopy to be sent to another physician  CB# [phone]  Ret'd call  Joselyn Martinez states they are trying to get her Dad on a kidney transplant list  She is working w/ the nurse coordinator of the program to obtain info; does not have it handy but will CB tomorrow w/ info  I will mail a med rec release form for completion and return

## 2022-01-11 NOTE — TELEPHONE ENCOUNTER
Karina malik w/ info:  [facility]  Nurse Coordinator:  Sandip Lewis  Fax# [phone]  Ph# [phone]  Colon & bx report to be compiled w/ other info for presentation to the Board for transplant consideration  Records fax'd to [facility] Transplant Program  Confirmation rec'd  Mailed [facility] record release form to Pt for completion and return for our records

## 2022-01-26 NOTE — TELEPHONE ENCOUNTER
[facility] med rec release form was ret'd by [employer] w/ notation "RTS no such #/unable to fwd"  Called Pt's daughter, Joselyn Martinez  Left VMM noting release form was ret'd; req CB w/ her Dad's current address  Pt's daughter, Joselyn Martinez, left [facility] mssg stating address of [address] is correct but needs [address] included  Re-sent [facility] med rec release form as above  Added add'l info to demographics

## 2022-06-13 NOTE — TELEPHONE ENCOUNTER
Going to get blood work today at [employer] like you to call her to discuss results later in week    She does not want to see another pcp in this office, so she will discuss with you who you can refer her too thanks
Please call patient and inform that his blood sugars extremely high with an A1c of 13 2  Because of his uncontrolled blood sugars, his lipid panel also shows very high levels of cholesterol and triglycerides  He will need to establish care with a new PCP as soon as possible to discuss his diabetes and recent blood work results in detail  He also should see a diabetes specialist and I would like to have him see the following physician:    RAFFI Steinberg    [facility]   [facility]     Phone:  [phone]   Fax:  [phone]
none

## 2022-12-06 ENCOUNTER — TELEPHONE (OUTPATIENT)
Dept: PODIATRY | Facility: CLINIC | Age: 67
End: 2022-12-06

## 2022-12-06 NOTE — TELEPHONE ENCOUNTER
Caller: Saúl Lr    Doctor: Zuhair Macdonald DPM    Reason for call: They have an appointment card from Dr Mao Blum old office showing that Lisa Piña is scheduled for an appointment on 12/8/22 at 3:00  They just received a reminder for an appointment on 12/12/22 @ 10:00    That is not going to work for the [facility] and they are asking to be put back on the 12/8/22    Call back#: [phone]

## 2022-12-12 ENCOUNTER — OFFICE VISIT (OUTPATIENT)
Dept: PODIATRY | Facility: CLINIC | Age: 67
End: 2022-12-12

## 2022-12-12 VITALS
WEIGHT: 283 LBS | BODY MASS INDEX: 40.52 KG/M2 | HEART RATE: 71 BPM | DIASTOLIC BLOOD PRESSURE: 69 MMHG | SYSTOLIC BLOOD PRESSURE: 175 MMHG | HEIGHT: 70 IN

## 2022-12-12 DIAGNOSIS — B35.1 ONYCHOMYCOSIS: Primary | ICD-10-CM

## 2022-12-12 DIAGNOSIS — E11.42 DIABETIC POLYNEUROPATHY ASSOCIATED WITH TYPE 2 DIABETES MELLITUS (HCC): ICD-10-CM

## 2022-12-12 NOTE — PROGRESS NOTES
PATIENT:  Briana Rios  1955    ASSESSMENT/PLAN:  1  Onychomycosis        2  Diabetic polyneuropathy associated with type 2 diabetes mellitus ([facility] )              No orders of the defined types were placed in this encounter  Disease prevention and related risk factors of diabetes were identified and discussed  The patient was educated in proper foot wear for diabetics  Also educated in daily foot assessment and routine diabetic foot care  Discussed the importance of controlling BS through diet and exercise  The patient will follow up in 9-12 weeks for further diabetic foot exam and care  PROCEDURE:  All mycotic toenails were reduced and debrided in length, width, and girth using a nail nipper and dremel  All hyperkeratotic skin lesion(s) were sharply pared with a scalpel with no evidence of ulceration  Patient tolerated procedure(s) well without complications  HPI:  Briana Rios is a 75 y  o year old male seen for diabetic foot exam   The patient has class findings with diabetes  BS is under control  The patient complained of thick toenails and no lesions  The patient denied any acute pedal disorder, redness, acute swelling, or recent injury          PAST MEDICAL HISTORY:  Past Medical History:   Diagnosis Date   • Asymptomatic proteinuria    • Atrial fibrillation (HCC)    • Atrial tachycardia (HCC)    • Back muscle spasm    • Colon polyp    • Diabetes insipidus ([facility] [address] )    • Diabetes mellitus (HCC)    • Edema    • GERD (gastroesophageal reflux disease)    • Gout    • High cholesterol    • Hyperopic astigmatism    • Hypertension    • Left ventricular enlargement    • Morbid obesity (HCC)    • PFO (patent foramen ovale)    • Renal insufficiency    • Rotator cuff arthropathy    • Sleep apnea        PAST SURGICAL HISTORY:  Past Surgical History:   Procedure Laterality Date   • COLONOSCOPY          ALLERGIES:  Iodinated diagnostic agents and Iodine - food allergy    MEDICATIONS:  Current Outpatient Medications   Medication Sig Dispense Refill   • allopurinol (ZYLOPRIM) 100 mg tablet Take 1 tablet (100 mg total) by mouth 2 (two) times a day 180 tablet 3   • amiodarone 200 mg tablet Take 200 mg by mouth daily     • apixaban (Eliquis) 5 mg TAKE 1 TABLET TWICE A DAY     • cephalexin (KEFLEX) 500 mg capsule Take 500 mg by mouth 2 (two) times a day     • Cholecalciferol (HM Vitamin D3) 50 MCG (2000 UT) CAPS Take by mouth 2 (two) times a day     • cyanocobalamin (VITAMIN B-12) 100 mcg tablet Take by mouth     • digoxin (LANOXIN) 0 125 mg tablet Take 125 mcg by mouth daily Take 1 tablet Tues, Thurs, Sat , Sun   None the rest of the week     • gabapentin (NEURONTIN) 300 mg capsule Take 1 capsule (300 mg total) by mouth daily (Patient taking differently: Take 600 mg by mouth daily ) 90 capsule 4   • glimepiride (AMARYL) 4 mg tablet Take 1 5 tablets (6 mg total) by mouth daily 90 tablet 2   • Glucosamine HCl-MSM (GLUCOSAMINE-MSM PO) Take 1,500 mg by mouth 2 (two) times a day     • Icosapent Ethyl (Vascepa) 1 g CAPS Take 2 g by mouth 2 (two) times a day     • Insulin Degludec 100 UNIT/ML SOLN Inject 20 Units under the skin daily      • insulin lispro (HumaLOG) 100 units/mL injection Inject under the skin 3 (three) times a day before meals 6-11 units     • lansoprazole (PREVACID) 30 mg capsule TAKE 1 CAPSULE DAILY 90 capsule 3   • losartan (COZAAR) 100 MG tablet Take 25 mg by mouth daily     • metoprolol succinate (TOPROL-XL) 25 mg 24 hr tablet Take 25 mg by mouth daily     • Multiple Vitamins-Minerals (MULTIVITAMIN ADULT) TABS every 24 hours (Patient not taking: Reported on 8/11/2021)     • nystatin-triamcinolone (MYCOLOG-II) cream Apply topically 3 (three) times a day (Patient not taking: Reported on 8/11/2021)     • Omega-3 Fatty Acids (FISH OIL) 1,000 mg Take 2 capsules daily (Patient not taking: Reported on 8/11/2021)     • rosuvastatin (CRESTOR) 20 MG tablet TAKE 1 TABLET DAILY 90 tablet 3   • spironolactone (ALDACTONE) 25 mg tablet Take 25 mg by mouth daily     • tamsulosin (FLOMAX) 0 4 mg Take 1 capsule (0 4 mg total) by mouth every 24 hours 90 capsule 2   • torsemide (DEMADEX) 20 mg tablet Take 20 mg by mouth daily       No current facility-administered medications for this visit  SOCIAL HISTORY:  Social History     Socioeconomic History   • Marital status: /Civil Union     Spouse name: None   • Number of children: None   • Years of education: None   • Highest education level: None   Occupational History   • None   Tobacco Use   • Smoking status: Former     Types: Cigarettes     Quit date:      Years since quittin 9   • Smokeless tobacco: Never   • Tobacco comments:     no passive smoke exposure  [employer] [place] [employer], former cigarette smoker who quit in 2004   Vaping Use   • Vaping Use: Never used   Substance and Sexual Activity   • Alcohol use: No   • Drug use: No   • Sexual activity: Never   Other Topics Concern   • None   Social History Narrative   • None     Social Determinants of Health     Financial Resource Strain: Not on file   Food Insecurity: Not on file   Transportation Needs: Not on file   Physical Activity: Not on file   Stress: Not on file   Social Connections: Not on file   Intimate Partner Violence: Not on file   Housing Stability: Not on file        REVIEW OF SYSTEMS:  GENERAL: No fever or chills  HEART: No chest pain, or palpitation  RESPIRATORY:  No acute SOB or cough  GI: No Nausea, vomit or diarrhea  NEUROLOGIC: No syncope or acute weakness    PHYSICAL EXAM:    BP (!) 175/69   Pulse 71   Ht 5' 10" (1 778 m)   Wt 128 kg (283 lb)   BMI 40 61 kg/m²     VASCULAR EXAM  Dorsalis pedis  absent, Posterior tibial artery  +1  The patient has class findings with skin atrophy, lack of digital hair, and nail dystrophy  There is +1 lower extremity edema bilaterally  Venous stasis skin changes noted BLE  NEUROLOGIC EXAM  Sensation is intact to light touch    Sensation is absent to 10gm monofilament  Vibratory sensation diminished  No focal neurologic deficit  Tingling paresthesias noted bilateral         DERMATOLOGIC EXAM:   No ulcer or cellulitis noted  The patient has dystrophic/hypertrophic toenails with yellow/white discoloration, onycholysis, and subungal debris  Fungal odor noted  Right foot nails mildly dystrophic 1 2 and 3 with 0 2 cm ave thickness  Left foot nails moderately dystrophic hallux with 0 5 cm thickness  Patient has no hyperkeratotic lesions  Texture, Tone and Turgor are diminished? Yes  No notable suspicious skin lesions  MUSCULOSKELETAL EXAM:   No acute joint pain, edema, or redness  No acute musculoskeletal problem  Patient has deformity including mild digital contractures consistent with hammertoes  Patient has slightly limited ambulation  Patient wears DM shoes? Yes    Risk Category:   1 = Loss of protective sensation    A)  Has the patient had a previous amputation of the foot or integral skeletal portion thereof? No  B)  Does the patient have absent dorsalis pedis or posterior tibial pulse? Yes        Does the patient have three of the following? Yes        1  Hair growth (increased or decreased), 2   Nail changes (thickening), 3   Pigmentary changes (discoloring), 4  Skin texture (thin, shiny) and 5  Skin color (rubor or redness)  C)  Does the patient have two of the following and one above? Yes       3  Edema and 4    Paraesthesias (abnormal spontaneous sensations in the feet)

## 2023-03-24 ENCOUNTER — OFFICE VISIT (OUTPATIENT)
Dept: PODIATRY | Facility: CLINIC | Age: 68
End: 2023-03-24

## 2023-03-24 VITALS
WEIGHT: 270 LBS | BODY MASS INDEX: 38.65 KG/M2 | DIASTOLIC BLOOD PRESSURE: 70 MMHG | HEIGHT: 70 IN | HEART RATE: 76 BPM | SYSTOLIC BLOOD PRESSURE: 130 MMHG

## 2023-03-24 DIAGNOSIS — E11.42 DIABETIC POLYNEUROPATHY ASSOCIATED WITH TYPE 2 DIABETES MELLITUS (HCC): ICD-10-CM

## 2023-03-24 DIAGNOSIS — B35.1 ONYCHOMYCOSIS: Primary | ICD-10-CM

## 2023-03-24 RX ORDER — SEMAGLUTIDE 1.34 MG/ML
INJECTION, SOLUTION SUBCUTANEOUS
COMMUNITY
Start: 2023-03-07

## 2023-03-28 NOTE — PROGRESS NOTES
PATIENT:  Angeles Garcia  1955    ASSESSMENT:  1  Onychomycosis        2  Diabetic polyneuropathy associated with type 2 diabetes mellitus ([address] )  Nail removal            Orders Placed This Encounter   Procedures   • Nail removal        PLAN:  Disease prevention and related risk factors of diabetes were identified and discussed  The patient was educated in proper foot wear for diabetics  Educated in daily foot assessment and routine diabetic foot care  Discussed the importance of controlling BS through diet and exercise  The patient will follow up in 9-12 weeks for further diabetic foot exam and care  Procedures: 67501, 99201  All mycotic toenails were reduced and debrided in length, width, and girth using a nail nipper and electric dremel  All hyperkeratotic skin lesion(s) if present were sharply pared with a #10 scalpel with no evidence of ulceration/abscess  Patient tolerated procedure(s) well without complications  Nail removal    Date/Time: 3/24/2023 2:00 PM  Performed by: Graig Cogan, DPM  Authorized by: Graig Cogan, DPM     Patient location:  Other (comment)Universal Protocol:  Consent: Verbal consent obtained  Risks and benefits: risks, benefits and alternatives were discussed  Consent given by: patient  Patient understanding: patient states understanding of the procedure being performed  Patient identity confirmed: verbally with patient      Nails trimmed:     Number of nails trimmed:  6  Post-procedure details:     Patient tolerance of procedure: Tolerated well, no immediate complications         HPI:  Angeles Garcia is a 75 y  o year old male seen for diabetic foot exam   The patient has class findings with diabetes  BS is under control  The patient complained of thick toenails  The patient denied any acute pedal disorder, redness, acute swelling, or recent injury        The following portions of the patient's history were reviewed and updated as appropriate: "allergies, current medications, past family history, past medical history, past social history, past surgical history and problem list     REVIEW OF SYSTEMS:  GENERAL: No fever or chills  HEART: No chest pain, or palpitation  RESPIRATORY:  No acute SOB or cough  GI: No Nausea, vomit or diarrhea  NEUROLOGIC: No syncope or acute weakness    PHYSICAL EXAM:    /70   Pulse 76   Ht 5' 10\" (1 778 m)   Wt 122 kg (270 lb)   BMI 38 74 kg/m²     VASCULAR EXAM  Posterior tibial artery +1 bilateral  Dorsalis pedis artery absent bilateral  The patient has class findings with skin atrophy, lack of digital hair, and nail dystrophy  There is +1 lower extremity edema bilaterally  Venous stasis skin changes noted BLE  No    NEUROLOGIC EXAM  Sensation is intact to light touch  Yes   Sensation is absent to 10gm monofilament  Vibratory sensation diminished  Tingling paresthesias noted bilateral       No focal neurologic deficit  DERMATOLOGIC EXAM:   Texture, Tone and Turgor are diminished bilateral   The patient has dystrophic/hypertrophic toenails with yellow/white discoloration, onycholysis, and subungal debris  Fungal odor noted  Brittle nature noted  Right foot nails severely dystrophic x3 with 1 25 cm ave thickness (1 2 and 3)  Left foot nails dystrophic x1 with 0 5 cm ave thickness (6)  Patient has hyperkeratotic lesions noted:  Right foot at none  Left foot at none  Ulcer or cellulitis noted  No  No notable suspicious skin lesions  MUSCULOSKELETAL EXAM:   No acute joint pain, edema, or redness  No acute musculoskeletal problem  Patient has deformity including mild digital contractures  Patient has no ambulation limitations  Patient wears DM shoes? Yes    Risk Category/Class Findings:  Q8 ( B2 ABC, B3)  1 = Loss of protective sensation    A1)  Has the patient had a previous amputation of the foot or integral skeletal portion thereof?  No  B1)  Does the patient have absent posterior tibial " pulse? No  B3)  Does the patient have absent dorsalis pedis? Yes  B2)  Does the patient have three of the following? Yes           1  Hair growth (increased or decreased), 2   Nail changes (thickening) and 3  Pigmentary changes (discoloring)  C)  Does the patient have two of the following and one above?  No

## 2023-06-27 ENCOUNTER — OFFICE VISIT (OUTPATIENT)
Dept: PODIATRY | Facility: CLINIC | Age: 68
End: 2023-06-27
Payer: MEDICARE

## 2023-06-27 VITALS
DIASTOLIC BLOOD PRESSURE: 81 MMHG | HEART RATE: 50 BPM | BODY MASS INDEX: 38.65 KG/M2 | SYSTOLIC BLOOD PRESSURE: 109 MMHG | WEIGHT: 270 LBS | HEIGHT: 70 IN

## 2023-06-27 DIAGNOSIS — B35.1 ONYCHOMYCOSIS: Primary | ICD-10-CM

## 2023-06-27 DIAGNOSIS — E11.42 DIABETIC POLYNEUROPATHY ASSOCIATED WITH TYPE 2 DIABETES MELLITUS (HCC): ICD-10-CM

## 2023-06-27 PROCEDURE — 11721 DEBRIDE NAIL 6 OR MORE: CPT | Performed by: PODIATRIST

## 2023-06-28 NOTE — PROGRESS NOTES
PATIENT:  Vivi Mcguire  1955    ASSESSMENT:  1  Onychomycosis        2  Diabetic polyneuropathy associated with type 2 diabetes mellitus ([address] )  Nail removal            Orders Placed This Encounter   Procedures   • Nail removal        PLAN:  Disease prevention and related risk factors of diabetes were identified and discussed  The patient was educated in proper foot wear for diabetics  Educated in daily foot assessment and routine diabetic foot care  Discussed the importance of controlling BS through diet and exercise  The patient will follow up in 9-12 weeks for further diabetic foot exam and care  Procedures: 62245, 82364  All mycotic toenails were reduced and debrided in length, width, and girth using a nail nipper and electric dremel  All hyperkeratotic skin lesion(s) if present were sharply pared with a #10 scalpel with no evidence of ulceration/abscess  Patient tolerated procedure(s) well without complications  Nail removal    Date/Time: 6/27/2023 12:30 PM    Performed by: Francisac Gandhi DPM  Authorized by: Francisca Gandhi DPM    Patient location:  Other (comment)Universal Protocol:  Consent: Verbal consent obtained  Risks and benefits: risks, benefits and alternatives were discussed  Consent given by: patient  Patient understanding: patient states understanding of the procedure being performed  Patient identity confirmed: verbally with patient      Nails trimmed:     Number of nails trimmed:  6  Post-procedure details:     Patient tolerance of procedure: Tolerated well, no immediate complications         HPI:  Vivi Mcguire is a 70 y  o year old male seen for diabetic foot exam   The patient has class findings with diabetes  BS is under control  The patient complained of thick toenails and slightly increasing tingling/type sensations in feet  The patient denied any acute pedal disorder, redness, acute swelling, or recent injury    Clinically unchanged from prior "visit  The following portions of the patient's history were reviewed and updated as appropriate: allergies, current medications, past family history, past medical history, past social history, past surgical history and problem list     REVIEW OF SYSTEMS:  GENERAL: No fever or chills  HEART: No chest pain, or palpitation  RESPIRATORY:  No acute SOB or cough  GI: No Nausea, vomit or diarrhea  NEUROLOGIC: No syncope or acute weakness    PHYSICAL EXAM:    /81   Pulse (!) 50   Ht 5' 10\" (1 778 m)   Wt 122 kg (270 lb)   BMI 38 74 kg/m²     VASCULAR EXAM  Posterior tibial artery +1 bilateral  Dorsalis pedis artery absent bilateral  The patient has class findings with skin atrophy, lack of digital hair, and nail dystrophy  There is +1 lower extremity edema bilaterally  Venous stasis skin changes noted BLE  No    NEUROLOGIC EXAM  Sensation is intact to light touch  Yes   Sensation is absent to 10gm monofilament  Vibratory sensation absent  Tingling paresthesias noted bilateral       No focal neurologic deficit  DERMATOLOGIC EXAM:   Texture, Tone and Turgor are diminished bilateral   The patient has dystrophic/hypertrophic toenails with yellow/white discoloration, onycholysis, and subungal debris  Fungal odor noted  Brittle nature noted  Right foot nails severely dystrophic x3 with 1 25 cm ave thickness (1 2 and 3)  Left foot nails dystrophic x1 with 0 5 cm ave thickness (6)  Patient has hyperkeratotic lesions noted:  Right foot at none  Left foot at none  Ulcer or cellulitis noted  No  No notable suspicious skin lesions  MUSCULOSKELETAL EXAM:   No acute joint pain, edema, or redness  No acute musculoskeletal problem  Patient has deformity including mild digital contractures  Patient has no ambulation limitations  Patient wears DM shoes?  Yes    Risk Category/Class Findings:  Q8 ( B2 ABC, B3)  1 = Loss of protective sensation    A1)  Has the patient had a previous amputation of the " foot or integral skeletal portion thereof? No  B1)  Does the patient have absent posterior tibial pulse? No  B3)  Does the patient have absent dorsalis pedis? Yes  B2)  Does the patient have three of the following? Yes           1  Hair growth (increased or decreased), 2   Nail changes (thickening) and 3  Pigmentary changes (discoloring)  C)  Does the patient have two of the following and one above?  No

## 2023-08-17 LAB
LEFT EYE DIABETIC RETINOPATHY: NORMAL
RIGHT EYE DIABETIC RETINOPATHY: NORMAL
SEVERITY (EYE EXAM): NORMAL

## 2023-09-28 ENCOUNTER — OFFICE VISIT (OUTPATIENT)
Dept: PODIATRY | Facility: CLINIC | Age: 68
End: 2023-09-28
Payer: MEDICARE

## 2023-09-28 VITALS
HEIGHT: 70 IN | SYSTOLIC BLOOD PRESSURE: 119 MMHG | HEART RATE: 43 BPM | BODY MASS INDEX: 38.65 KG/M2 | DIASTOLIC BLOOD PRESSURE: 69 MMHG | WEIGHT: 270 LBS

## 2023-09-28 DIAGNOSIS — B35.1 ONYCHOMYCOSIS: Primary | ICD-10-CM

## 2023-09-28 DIAGNOSIS — E11.42 DIABETIC POLYNEUROPATHY ASSOCIATED WITH TYPE 2 DIABETES MELLITUS (HCC): ICD-10-CM

## 2023-09-28 PROCEDURE — 11721 DEBRIDE NAIL 6 OR MORE: CPT | Performed by: PODIATRIST

## 2023-09-28 NOTE — PROGRESS NOTES
PATIENT:  Ofelia Stout  1955    ASSESSMENT:  1. Onychomycosis        2. Diabetic polyneuropathy associated with type 2 diabetes mellitus ([address])  Nail removal            Orders Placed This Encounter   Procedures   • Nail removal          PLAN:  Disease prevention and related risk factors of diabetes were identified and discussed. The patient was educated in proper foot wear for diabetics. Educated in daily foot assessment and routine diabetic foot care. Discussed the importance of controlling BS through diet and exercise. The patient will follow up in 9-12 weeks for further diabetic foot exam and care. Procedures: 52111, 80699  All mycotic toenails were reduced and debrided in length, width, and girth using a nail nipper and electric dremel. All hyperkeratotic skin lesion(s) if present were sharply pared with a #10 scalpel with no evidence of ulceration/abscess. Patient tolerated procedure(s) well without complications. Nail removal    Date/Time: 9/28/2023 11:15 AM    Performed by: Nichol Carrasco DPM  Authorized by: Nichol Carrasco DPM    Patient location:  Other (comment)Universal Protocol:  Consent: Verbal consent obtained. Risks and benefits: risks, benefits and alternatives were discussed  Consent given by: patient  Patient understanding: patient states understanding of the procedure being performed  Patient identity confirmed: verbally with patient    Nails trimmed:     Number of nails trimmed:  6  Post-procedure details:     Patient tolerance of procedure: Tolerated well, no immediate complications       HPI:  Ofelia Stout is a 70 y. o.year old male seen for diabetic foot exam.  The patient has class findings with diabetes. BS is under control. The patient complained of thick toenails and slightly increasing tingling/type sensations in feet. The patient denied any acute pedal disorder, redness, acute swelling, or recent injury.   Clinically unchanged from prior visit.    The following portions of the patient's history were reviewed and updated as appropriate: allergies, current medications, past family history, past medical history, past social history, past surgical history and problem list.    REVIEW OF SYSTEMS:  GENERAL: No fever or chills  HEART: No chest pain, or palpitation  RESPIRATORY:  No acute SOB or cough  GI: No Nausea, vomit or diarrhea  NEUROLOGIC: No syncope or acute weakness    PHYSICAL EXAM:    /69   Pulse (!) 43   Ht 5' 10" (1.778 m)   Wt 122 kg (270 lb)   BMI 38.74 kg/m²     VASCULAR EXAM  Posterior tibial artery +1 bilateral  Dorsalis pedis artery absent bilateral  The patient has class findings with skin atrophy, lack of digital hair, and nail dystrophy. There is +1 lower extremity edema bilaterally. Venous stasis skin changes noted BLE. No    NEUROLOGIC EXAM  Sensation is intact to light touch. Yes   Sensation is absent to 10gm monofilament. Vibratory sensation absent. Tingling paresthesias noted bilateral.      No focal neurologic deficit. DERMATOLOGIC EXAM:   Texture, Tone and Turgor are diminished bilateral.  The patient has dystrophic/hypertrophic toenails with yellow/white discoloration, onycholysis, and subungal debris. Fungal odor noted. Brittle nature noted. Right foot nails severely dystrophic x3 with 1.25 cm ave thickness (1 2 and 3)  Left foot nails dystrophic x1 with 0.5 cm ave thickness (6)  Patient has hyperkeratotic lesions noted:  Right foot at none. Left foot at none  Ulcer or cellulitis noted. No  No notable suspicious skin lesions. MUSCULOSKELETAL EXAM:   No acute joint pain, edema, or redness. No acute musculoskeletal problem. Patient has deformity including mild digital contractures. Patient has no ambulation limitations. Patient wears DM shoes?  Yes    Risk Category/Class Findings:  Q8 ( B2 ABC, B3)  1 = Loss of protective sensation    A1)  Has the patient had a previous amputation of the foot or integral skeletal portion thereof? No  B1)  Does the patient have absent posterior tibial pulse? No  B3)  Does the patient have absent dorsalis pedis? Yes  B2)  Does the patient have three of the following? Yes           1. Hair growth (increased or decreased), 2.  Nail changes (thickening) and 3. Pigmentary changes (discoloring)  C)  Does the patient have two of the following and one above?  No

## 2024-02-08 ENCOUNTER — TELEPHONE (OUTPATIENT)
Age: 69
End: 2024-02-08

## 2024-02-08 NOTE — TELEPHONE ENCOUNTER
Caller: Wilfredo    Doctor/Office: Luan/JANAK    #: [phone]    Escalation: Appointment Patient spouse called and wanted to know if t here is anyway you could get him in any sooner then May 2nd.  He had to cx his Dec appt due to a kidney transplant and now his appts are finally settling down.  Please advise thank you.

## 2024-02-27 ENCOUNTER — OFFICE VISIT (OUTPATIENT)
Dept: PODIATRY | Facility: CLINIC | Age: 69
End: 2024-02-27
Payer: MEDICARE

## 2024-02-27 VITALS — HEIGHT: 70 IN | BODY MASS INDEX: 38.74 KG/M2

## 2024-02-27 DIAGNOSIS — B35.1 ONYCHOMYCOSIS: Primary | ICD-10-CM

## 2024-02-27 DIAGNOSIS — E11.42 DIABETIC POLYNEUROPATHY ASSOCIATED WITH TYPE 2 DIABETES MELLITUS (HCC): ICD-10-CM

## 2024-02-27 PROCEDURE — G0127 TRIM NAIL(S): HCPCS | Performed by: PODIATRIST

## 2024-02-27 PROCEDURE — 11719 TRIM NAIL(S) ANY NUMBER: CPT | Performed by: PODIATRIST

## 2024-02-27 PROCEDURE — 11720 DEBRIDE NAIL 1-5: CPT | Performed by: PODIATRIST

## 2024-02-27 RX ORDER — PREDNISONE 10 MG/1
10 TABLET ORAL DAILY
COMMUNITY
Start: 2024-01-11

## 2024-02-27 RX ORDER — FUROSEMIDE 80 MG
80 TABLET ORAL
COMMUNITY
Start: 2024-02-24

## 2024-02-27 NOTE — PROGRESS NOTES
PATIENT:  Ernest Markley  1955    ASSESSMENT:  1. Onychomycosis        2. Diabetic polyneuropathy associated with type 2 diabetes mellitus ([facility])  Nail removal            Orders Placed This Encounter   Procedures    Nail removal            PLAN:  Disease prevention and related risk factors of diabetes were identified and discussed.    The patient was educated in proper foot wear for diabetics.    Educated in daily foot assessment and routine diabetic foot care.    Discussed the importance of controlling BS through diet and exercise.    The patient will follow up in 9-12 weeks for further diabetic foot exam and care.      Procedures: 05760, 04469  All mycotic toenails were reduced and debrided in length, width, and girth using a nail nipper and electric dremel.    All hyperkeratotic skin lesion(s) if present were sharply pared with a #10 scalpel with no evidence of ulceration/abscess.    Patient tolerated procedure(s) well without complications.    Nail removal    Date/Time: 2/27/2024 3:15 PM    Performed by: Addy Roland DPM  Authorized by: Addy Roland DPM    Patient location:  Other (comment)Universal Protocol:  Consent: Verbal consent obtained.  Risks and benefits: risks, benefits and alternatives were discussed  Consent given by: patient  Patient understanding: patient states understanding of the procedure being performed  Patient identity confirmed: verbally with patient    Nails trimmed:     Number of nails trimmed:  6  Post-procedure details:     Patient tolerance of procedure:  Tolerated well, no immediate complications       HPI:  Ernest Markley is a 68 y.o.year old male seen for diabetic foot exam.  The patient has class findings with diabetes.   Reports his blood sugars have been up and down recently which she attributes to the steroids he has to take for antirejection purposes.  The patient complained of thick toenails and slightly increasing tingling/type sensations in feet.  The patient  "denied any acute pedal disorder, redness, acute swelling, or recent injury.  Clinically unchanged from prior visit.  Patient reports having undergone kidney transplant last year which is why he has not been seen for quite some time.    The following portions of the patient's history were reviewed and updated as appropriate: allergies, current medications, past family history, past medical history, past social history, past surgical history and problem list.    REVIEW OF SYSTEMS:  GENERAL: No fever or chills  HEART: No chest pain, or palpitation  RESPIRATORY:  No acute SOB or cough  GI: No Nausea, vomit or diarrhea  NEUROLOGIC: No syncope or acute weakness    PHYSICAL EXAM:    Ht 5' 10\" (1.778 m)   BMI 38.74 kg/m²     VASCULAR EXAM  Posterior tibial artery +1 bilateral  Dorsalis pedis artery absent bilateral  The patient has class findings with skin atrophy, lack of digital hair, and nail dystrophy.    There is +1 lower extremity edema bilaterally.    Venous stasis skin changes noted BLE. No    NEUROLOGIC EXAM  Sensation is intact to light touch. Yes   Sensation is absent to 10gm monofilament.  Vibratory sensation absent.     Tingling paresthesias noted bilateral.      No focal neurologic deficit.     DERMATOLOGIC EXAM:   Texture, Tone and Turgor are diminished bilateral.  The patient has dystrophic/hypertrophic toenails with yellow/white discoloration, onycholysis, and subungal debris.   Fungal odor noted.  Brittle nature noted.  Right foot nails severely dystrophic x3 with 1.25 cm ave thickness (1 2 and 3)  Left foot nails dystrophic x1 with 0.5 cm ave thickness (6)  Patient has hyperkeratotic lesions noted:  Right foot at none.  Left foot at none  Ulcer or cellulitis noted.No  No notable suspicious skin lesions.      MUSCULOSKELETAL EXAM:   No acute joint pain, edema, or redness.  No acute musculoskeletal problem.    Patient has deformity including mild digital contractures. Patient has no ambulation limitations. "      Patient wears DM shoes? Yes    Risk Category/Class Findings:  Q8 ( B2 ABC, B3)  1 = Loss of protective sensation    A1)  Has the patient had a previous amputation of the foot or integral skeletal portion thereof? No  B1)  Does the patient have absent posterior tibial pulse? No  B3)  Does the patient have absent dorsalis pedis? Yes  B2)  Does the patient have three of the following? Yes           1.  Hair growth (increased or decreased), 2.  Nail changes (thickening) and 3.  Pigmentary changes (discoloring)  C)  Does the patient have two of the following and one above? No                Diabetic Foot Exam    Patient's shoes and socks removed.    Right Foot/Ankle   Right Foot Inspection  Skin Exam: skin normal and skin intact. No dry skin, no warmth, no callus, no erythema, no maceration, no abnormal color, no pre-ulcer, no ulcer and no callus.     Toe Exam: swelling (+1).     Sensory   Vibration: absent  Proprioception: intact  Monofilament testing: intact    Vascular  Capillary refills: < 3 seconds  The right DP pulse is 0. The right PT pulse is 1+.     Left Foot/Ankle  Left Foot Inspection  Skin Exam: skin normal and skin intact. No dry skin, no warmth, no erythema, no maceration, normal color, no pre-ulcer, no ulcer and no callus.     Toe Exam: swelling (+1).     Sensory   Vibration: absent  Proprioception: intact  Monofilament testing: intact    Vascular  Capillary refills: < 3 seconds  The left DP pulse is 0. The left PT pulse is 1+.     Assign Risk Category  No deformity present  No loss of protective sensation

## 2024-05-07 ENCOUNTER — TELEPHONE (OUTPATIENT)
Age: 69
End: 2024-05-07

## 2024-05-07 NOTE — TELEPHONE ENCOUNTER
Caller: Lynn Markley    Doctor and/or Office: Dr. Roland/Leoncio    #: [phone]    Escalation: Appointment Patients wife, Tricia would like a return call. Yesterday she spoke to someone in the office who told her patient needs to be seen by PCP before his next visit with Dr. Roland. Tricia feels this is incorrect and would like to speak to someone. Thank you  
- - -

## 2024-05-24 ENCOUNTER — OFFICE VISIT (OUTPATIENT)
Dept: PODIATRY | Facility: CLINIC | Age: 69
End: 2024-05-24
Payer: MEDICARE

## 2024-05-24 VITALS
HEIGHT: 70 IN | HEART RATE: 79 BPM | DIASTOLIC BLOOD PRESSURE: 78 MMHG | SYSTOLIC BLOOD PRESSURE: 120 MMHG | BODY MASS INDEX: 41.09 KG/M2 | WEIGHT: 287 LBS

## 2024-05-24 DIAGNOSIS — B35.1 ONYCHOMYCOSIS: Primary | ICD-10-CM

## 2024-05-24 DIAGNOSIS — E11.42 DIABETIC POLYNEUROPATHY ASSOCIATED WITH TYPE 2 DIABETES MELLITUS (HCC): ICD-10-CM

## 2024-05-24 PROCEDURE — 11720 DEBRIDE NAIL 1-5: CPT | Performed by: PODIATRIST

## 2024-05-24 PROCEDURE — 11719 TRIM NAIL(S) ANY NUMBER: CPT | Performed by: PODIATRIST

## 2024-05-24 RX ORDER — SULFAMETHOXAZOLE AND TRIMETHOPRIM 400; 80 MG/1; MG/1
2 TABLET ORAL EVERY 12 HOURS SCHEDULED
COMMUNITY

## 2024-05-24 RX ORDER — FINASTERIDE 5 MG/1
5 TABLET, FILM COATED ORAL DAILY
COMMUNITY
Start: 2024-03-14 | End: 2025-03-14

## 2024-05-24 RX ORDER — TACROLIMUS 1 MG/1
1 CAPSULE ORAL EVERY 12 HOURS SCHEDULED
COMMUNITY

## 2024-05-24 RX ORDER — VALGANCICLOVIR 450 MG/1
900 TABLET, FILM COATED ORAL 2 TIMES DAILY WITH MEALS
COMMUNITY

## 2024-05-24 RX ORDER — MYCOPHENOLIC ACID 180 MG/1
720 TABLET, DELAYED RELEASE ORAL 2 TIMES DAILY
COMMUNITY
Start: 2023-12-18

## 2024-06-22 NOTE — PROGRESS NOTES
PATIENT:  Ernest Markley  1955    ASSESSMENT:  1. Onychomycosis        2. Diabetic polyneuropathy associated with type 2 diabetes mellitus (HCC)  Nail removal            Orders Placed This Encounter   Procedures   • Nail removal            PLAN:  Disease prevention and related risk factors of diabetes were identified and discussed.    The patient was educated in proper foot wear for diabetics.    Educated in daily foot assessment and routine diabetic foot care.    Discussed the importance of controlling BS through diet and exercise.    The patient will follow up in 9-12 weeks for further diabetic foot exam and care.      Procedures: 83235, 67773  All mycotic toenails were reduced and debrided in length, width, and girth using a nail nipper and electric dremel.    All hyperkeratotic skin lesion(s) if present were sharply pared with a #10 scalpel with no evidence of ulceration/abscess.    Patient tolerated procedure(s) well without complications.    Nail removal    Date/Time: 5/24/2024 3:30 PM    Performed by: Addy Roland DPM  Authorized by: Addy Roland DPM    Patient location:  Other (comment)Universal Protocol:  Consent: Verbal consent obtained.  Risks and benefits: risks, benefits and alternatives were discussed  Consent given by: patient  Patient understanding: patient states understanding of the procedure being performed  Patient identity confirmed: verbally with patient    Nails trimmed:     Number of nails trimmed:  6  Post-procedure details:     Patient tolerance of procedure:  Tolerated well, no immediate complications       HPI:  Ernest Markley is a 69 y.o.year old male seen for diabetic foot exam.  The patient has class findings with diabetes.   Reports his blood sugars have been up and down recently which she attributes to the steroids he has to take for antirejection purposes.  The patient complained of thick toenails and slightly increasing tingling/type sensations in feet.  The patient  "denied any acute pedal disorder, redness, acute swelling, or recent injury.  Clinically unchanged from prior visit.  Patient reports having undergone kidney transplant last year which is why he has not been seen for quite some time.    The following portions of the patient's history were reviewed and updated as appropriate: allergies, current medications, past family history, past medical history, past social history, past surgical history and problem list.    REVIEW OF SYSTEMS:  GENERAL: No fever or chills  HEART: No chest pain, or palpitation  RESPIRATORY:  No acute SOB or cough  GI: No Nausea, vomit or diarrhea  NEUROLOGIC: No syncope or acute weakness    PHYSICAL EXAM:    /78 (BP Location: Right arm, Patient Position: Sitting, Cuff Size: Adult)   Pulse 79   Ht 5' 9.5\" (1.765 m) Comment: stated  Wt 130 kg (287 lb)   BMI 41.77 kg/m²     VASCULAR EXAM  Posterior tibial artery +1 bilateral  Dorsalis pedis artery absent bilateral  The patient has class findings with skin atrophy, lack of digital hair, and nail dystrophy.    There is +1 lower extremity edema bilaterally.    Venous stasis skin changes noted BLE. No    NEUROLOGIC EXAM  Sensation is intact to light touch. Yes   Sensation is absent to 10gm monofilament.  Vibratory sensation absent.     Tingling paresthesias noted bilateral.      No focal neurologic deficit.     DERMATOLOGIC EXAM:   Texture, Tone and Turgor are diminished bilateral.  The patient has dystrophic/hypertrophic toenails with yellow/white discoloration, onycholysis, and subungal debris.   Fungal odor noted.  Brittle nature noted.  Right foot nails severely dystrophic x3 with 1.25 cm ave thickness (1 2 and 3)  Left foot nails dystrophic x1 with 0.5 cm ave thickness (6)  Patient has hyperkeratotic lesions noted:  Right foot at none.  Left foot at none  Ulcer or cellulitis noted.No  No notable suspicious skin lesions.      MUSCULOSKELETAL EXAM:   No acute joint pain, edema, or redness.  " No acute musculoskeletal problem.    Patient has deformity including mild digital contractures. Patient has no ambulation limitations.      Patient wears DM shoes? Yes    Risk Category/Class Findings:  Q8 ( B2 ABC, B3)  1 = Loss of protective sensation    A1)  Has the patient had a previous amputation of the foot or integral skeletal portion thereof? No  B1)  Does the patient have absent posterior tibial pulse? No  B3)  Does the patient have absent dorsalis pedis? Yes  B2)  Does the patient have three of the following? Yes           1.  Hair growth (increased or decreased), 2.  Nail changes (thickening) and 3.  Pigmentary changes (discoloring)  C)  Does the patient have two of the following and one above? No                Diabetic Foot Exam    Patient's shoes and socks removed.    Right Foot/Ankle   Right Foot Inspection  Skin Exam: skin normal and skin intact. No dry skin, no warmth, no callus, no erythema, no maceration, no abnormal color, no pre-ulcer, no ulcer and no callus.     Toe Exam: swelling (+1).     Sensory   Vibration: absent  Proprioception: intact  Monofilament testing: intact    Vascular  Capillary refills: < 3 seconds  The right DP pulse is 0. The right PT pulse is 1+.     Left Foot/Ankle  Left Foot Inspection  Skin Exam: skin normal and skin intact. No dry skin, no warmth, no erythema, no maceration, normal color, no pre-ulcer, no ulcer and no callus.     Toe Exam: swelling (+1).     Sensory   Vibration: absent  Proprioception: intact  Monofilament testing: intact    Vascular  Capillary refills: < 3 seconds  The left DP pulse is 0. The left PT pulse is 1+.     Assign Risk Category  No deformity present  No loss of protective sensation

## 2024-08-23 ENCOUNTER — OFFICE VISIT (OUTPATIENT)
Dept: PODIATRY | Facility: CLINIC | Age: 69
End: 2024-08-23
Payer: MEDICARE

## 2024-08-23 VITALS
WEIGHT: 284 LBS | SYSTOLIC BLOOD PRESSURE: 145 MMHG | BODY MASS INDEX: 40.66 KG/M2 | HEIGHT: 70 IN | DIASTOLIC BLOOD PRESSURE: 90 MMHG | HEART RATE: 71 BPM

## 2024-08-23 DIAGNOSIS — B35.1 ONYCHOMYCOSIS: Primary | ICD-10-CM

## 2024-08-23 DIAGNOSIS — E11.42 DIABETIC POLYNEUROPATHY ASSOCIATED WITH TYPE 2 DIABETES MELLITUS (HCC): ICD-10-CM

## 2024-08-23 PROCEDURE — 11719 TRIM NAIL(S) ANY NUMBER: CPT | Performed by: PODIATRIST

## 2024-08-23 PROCEDURE — 11720 DEBRIDE NAIL 1-5: CPT | Performed by: PODIATRIST

## 2024-08-23 NOTE — PROGRESS NOTES
PATIENT:  Ernest Markley  1955    ASSESSMENT:  1. Onychomycosis        2. Diabetic polyneuropathy associated with type 2 diabetes mellitus (HCC)  Nail removal            Orders Placed This Encounter   Procedures   • Nail removal            PLAN:  Disease prevention and related risk factors of diabetes were identified and discussed.    The patient was educated in proper foot wear for diabetics.    Educated in daily foot assessment and routine diabetic foot care.    Discussed the importance of controlling BS through diet and exercise.    The patient will follow up in 9-12 weeks for further diabetic foot exam and care.      Procedures: 42694, 92598  All mycotic toenails were reduced and debrided in length, width, and girth using a nail nipper and electric dremel.    All hyperkeratotic skin lesion(s) if present were sharply pared with a #10 scalpel with no evidence of ulceration/abscess.    Patient tolerated procedure(s) well without complications.    Nail removal    Date/Time: 8/23/2024 4:00 PM    Performed by: Addy Roland DPM  Authorized by: Addy Roland DPM    Patient location:  Other (comment)Universal Protocol:  Consent: Verbal consent obtained.  Risks and benefits: risks, benefits and alternatives were discussed  Consent given by: patient  Patient understanding: patient states understanding of the procedure being performed  Patient identity confirmed: verbally with patient    Nails trimmed:     Number of nails trimmed:  6  Post-procedure details:     Patient tolerance of procedure:  Tolerated well, no immediate complications       HPI:  Ernest Markley is a 69 y.o.year old male seen for diabetic foot exam.  The patient has class findings with diabetes.   Reports his blood sugars have been up and down recently which she attributes to the steroids he has to take for antirejection purposes.  The patient complained of thick toenails and slightly increasing tingling/type sensations in feet.  The patient  "denied any acute pedal disorder, redness, acute swelling, or recent injury.  Clinically unchanged from prior visit.  Patient reports having undergone kidney transplant last year which is why he has not been seen for quite some time.    The following portions of the patient's history were reviewed and updated as appropriate: allergies, current medications, past family history, past medical history, past social history, past surgical history and problem list.    REVIEW OF SYSTEMS:  GENERAL: No fever or chills  HEART: No chest pain, or palpitation  RESPIRATORY:  No acute SOB or cough  GI: No Nausea, vomit or diarrhea  NEUROLOGIC: No syncope or acute weakness    PHYSICAL EXAM:    /90 (BP Location: Right arm, Patient Position: Sitting, Cuff Size: Adult)   Pulse 71   Ht 5' 9.5\" (1.765 m) Comment: stated  Wt 129 kg (284 lb)   BMI 41.34 kg/m²     VASCULAR EXAM  Posterior tibial artery +1 bilateral  Dorsalis pedis artery absent bilateral  The patient has class findings with skin atrophy, lack of digital hair, and nail dystrophy.    There is +1 lower extremity edema bilaterally.    Venous stasis skin changes noted BLE. No    NEUROLOGIC EXAM  Sensation is intact to light touch. Yes   Sensation is absent to 10gm monofilament.  Vibratory sensation absent.     Tingling paresthesias noted bilateral.      No focal neurologic deficit.     DERMATOLOGIC EXAM:   Texture, Tone and Turgor are diminished bilateral.  The patient has dystrophic/hypertrophic toenails with yellow/white discoloration, onycholysis, and subungal debris.   Fungal odor noted.  Brittle nature noted.  Right foot nails severely dystrophic x3 with 1.25 cm ave thickness (1 2 and 3)  Left foot nails dystrophic x1 with 0.5 cm ave thickness (6)  Patient has hyperkeratotic lesions noted:  Right foot at none.  Left foot at none  Ulcer or cellulitis noted.No  No notable suspicious skin lesions.      MUSCULOSKELETAL EXAM:   No acute joint pain, edema, or redness.  " No acute musculoskeletal problem.    Patient has deformity including mild digital contractures. Patient has no ambulation limitations.      Patient wears DM shoes? Yes    Risk Category/Class Findings:  Q8 ( B2 ABC, B3)  1 = Loss of protective sensation    A1)  Has the patient had a previous amputation of the foot or integral skeletal portion thereof? No  B1)  Does the patient have absent posterior tibial pulse? No  B3)  Does the patient have absent dorsalis pedis? Yes  B2)  Does the patient have three of the following? Yes           1.  Hair growth (increased or decreased), 2.  Nail changes (thickening) and 3.  Pigmentary changes (discoloring)  C)  Does the patient have two of the following and one above? No                Diabetic Foot Exam    Patient's shoes and socks removed.    Right Foot/Ankle   Right Foot Inspection  Skin Exam: skin normal and skin intact. No dry skin, no warmth, no callus, no erythema, no maceration, no abnormal color, no pre-ulcer, no ulcer and no callus.     Toe Exam: swelling (+1).     Sensory   Vibration: absent  Proprioception: intact  Monofilament testing: intact    Vascular  Capillary refills: < 3 seconds  The right DP pulse is 0. The right PT pulse is 1+.     Left Foot/Ankle  Left Foot Inspection  Skin Exam: skin normal and skin intact. No dry skin, no warmth, no erythema, no maceration, normal color, no pre-ulcer, no ulcer and no callus.     Toe Exam: swelling (+1).     Sensory   Vibration: absent  Proprioception: intact  Monofilament testing: intact    Vascular  Capillary refills: < 3 seconds  The left DP pulse is 0. The left PT pulse is 1+.     Assign Risk Category  No deformity present  No loss of protective sensation

## 2024-11-19 ENCOUNTER — OFFICE VISIT (OUTPATIENT)
Dept: PODIATRY | Facility: CLINIC | Age: 69
End: 2024-11-19
Payer: MEDICARE

## 2024-11-19 VITALS
SYSTOLIC BLOOD PRESSURE: 120 MMHG | HEIGHT: 70 IN | DIASTOLIC BLOOD PRESSURE: 75 MMHG | BODY MASS INDEX: 43.38 KG/M2 | WEIGHT: 303 LBS | HEART RATE: 132 BPM

## 2024-11-19 DIAGNOSIS — B35.1 ONYCHOMYCOSIS: Primary | ICD-10-CM

## 2024-11-19 DIAGNOSIS — E11.42 DIABETIC POLYNEUROPATHY ASSOCIATED WITH TYPE 2 DIABETES MELLITUS (HCC): ICD-10-CM

## 2024-11-19 PROCEDURE — 11721 DEBRIDE NAIL 6 OR MORE: CPT | Performed by: PODIATRIST

## 2024-11-25 NOTE — PROGRESS NOTES
"   PATIENT:  Ernest Markley  1955    ASSESSMENT:  1. Onychomycosis        2. Diabetic polyneuropathy associated with type 2 diabetes mellitus (HCC)           No orders of the defined types were placed in this encounter.       PLAN:  Disease prevention and related risk factors of diabetes were identified and discussed.    The patient was educated in proper foot wear for diabetics.    Educated in daily foot assessment and routine diabetic foot care.    Discussed the importance of controlling BS through diet and exercise.    The patient will follow up in 9-12 weeks for further diabetic foot exam and care.      Procedures: 86456  All mycotic toenails were reduced and debrided in length, width, and girth using a nail nipper and electric dremel.    All hyperkeratotic skin lesion(s) if present were sharply pared with a #10 scalpel with no evidence of ulceration/abscess.    Patient tolerated procedure(s) well without complications.    Procedures     HPI:  Ernest Markley is a 69 y.o.year old male seen for diabetic foot exam.  The patient has class findings with diabetes.   The patient complained of thick toenails and slightly increasing tingling/type sensations in feet.  The patient denied any acute pedal disorder, redness, acute swelling, or recent injury.  Clinically unchanged from prior visit.  Significant past medical history of kidney transplant    The following portions of the patient's history were reviewed and updated as appropriate: allergies, current medications, past family history, past medical history, past social history, past surgical history and problem list.    REVIEW OF SYSTEMS:  GENERAL: No fever or chills  HEART: No chest pain, or palpitation  RESPIRATORY:  No acute SOB or cough  GI: No Nausea, vomit or diarrhea  NEUROLOGIC: No syncope or acute weakness    PHYSICAL EXAM:    /75 (BP Location: Right arm, Patient Position: Sitting, Cuff Size: Large)   Pulse (!) 132   Ht 5' 9.5\" (1.765 m)   Wt " (!) 137 kg (303 lb)   BMI 44.10 kg/m²     VASCULAR EXAM  Posterior tibial artery +1 bilateral  Dorsalis pedis artery absent bilateral  The patient has class findings with skin atrophy, lack of digital hair, and nail dystrophy.    There is +1 lower extremity edema bilaterally.    Venous stasis skin changes noted BLE. No    NEUROLOGIC EXAM  Sensation is intact to light touch. Yes   Sensation is absent to 10gm monofilament.  Vibratory sensation absent.     Tingling numb paresthesias noted bilateral.      No focal neurologic deficit.     DERMATOLOGIC EXAM:   Texture, Tone and Turgor are diminished bilateral.  The patient has dystrophic/hypertrophic toenails with yellow/white discoloration, onycholysis, and subungal debris.   Fungal odor noted.  Brittle nature noted.  Right foot nails severely dystrophic x3 with 0.4 cm ave thickness (1 2 and 3)  Left foot nails dystrophic x3 with 0.5 cm ave thickness (1 2 and 4)  )Patient has hyperkeratotic lesions noted:  Right foot at none.  Left foot at none  Ulcer or cellulitis noted.No  No notable suspicious skin lesions.      MUSCULOSKELETAL EXAM:   No acute joint pain, edema, or redness.  No acute musculoskeletal problem.    Patient has deformity including mild digital contractures. Patient has no ambulation limitations.      Patient wears DM shoes? Yes    Risk Category/Class Findings:  Q8 ( B2 ABC, B3)  1 = Loss of protective sensation    A1)  Has the patient had a previous amputation of the foot or integral skeletal portion thereof? No  B1)  Does the patient have absent posterior tibial pulse? No  B3)  Does the patient have absent dorsalis pedis? Yes  B2)  Does the patient have three of the following? Yes           1.  Hair growth (increased or decreased), 2.  Nail changes (thickening) and 3.  Pigmentary changes (discoloring)  C)  Does the patient have two of the following and one above? No                Diabetic Foot Exam    Patient's shoes and socks removed.    Right  Foot/Ankle   Right Foot Inspection  Skin Exam: skin normal and skin intact. No dry skin, no warmth, no callus, no erythema, no maceration, no abnormal color, no pre-ulcer, no ulcer and no callus.     Toe Exam: swelling (+1).     Sensory   Vibration: absent  Proprioception: intact  Monofilament testing: intact    Vascular  Capillary refills: < 3 seconds  The right DP pulse is 0. The right PT pulse is 1+.     Left Foot/Ankle  Left Foot Inspection  Skin Exam: skin normal and skin intact. No dry skin, no warmth, no erythema, no maceration, normal color, no pre-ulcer, no ulcer and no callus.     Toe Exam: swelling (+1).     Sensory   Vibration: absent  Proprioception: intact  Monofilament testing: intact    Vascular  Capillary refills: < 3 seconds  The left DP pulse is 0. The left PT pulse is 1+.     Assign Risk Category  No deformity present  No loss of protective sensation  Weak pulses  Risk: 1

## 2025-02-20 ENCOUNTER — OFFICE VISIT (OUTPATIENT)
Dept: PODIATRY | Facility: CLINIC | Age: 70
End: 2025-02-20
Payer: MEDICARE

## 2025-02-20 VITALS — HEIGHT: 70 IN | WEIGHT: 310 LBS | BODY MASS INDEX: 44.38 KG/M2

## 2025-02-20 DIAGNOSIS — B35.1 ONYCHOMYCOSIS: Primary | ICD-10-CM

## 2025-02-20 DIAGNOSIS — E11.42 DIABETIC POLYNEUROPATHY ASSOCIATED WITH TYPE 2 DIABETES MELLITUS (HCC): ICD-10-CM

## 2025-02-20 DIAGNOSIS — L84 CORNS AND CALLUS: ICD-10-CM

## 2025-02-20 PROCEDURE — RECHECK: Performed by: PODIATRIST

## 2025-02-20 PROCEDURE — 11055 PARING/CUTG B9 HYPRKER LES 1: CPT | Performed by: PODIATRIST

## 2025-02-20 PROCEDURE — 11721 DEBRIDE NAIL 6 OR MORE: CPT | Performed by: PODIATRIST

## 2025-02-20 RX ORDER — INSULIN LISPRO-AABC 100 [IU]/ML
INJECTION, SOLUTION SUBCUTANEOUS
COMMUNITY
Start: 2024-11-15

## 2025-02-20 NOTE — PROGRESS NOTES
"   PATIENT:  Ernest Markley  1955    ASSESSMENT:  1. Onychomycosis        2. Corns and callus        3. Diabetic polyneuropathy associated with type 2 diabetes mellitus (HCC)             No orders of the defined types were placed in this encounter.       PLAN:  Disease prevention and related risk factors of diabetes were identified and discussed.    The patient was educated in proper foot wear for diabetics.    Educated in daily foot assessment and routine diabetic foot care.    Discussed the importance of controlling BS through diet and exercise.    The patient will follow up in 9-12 weeks for further diabetic foot exam and care.      Procedures: 65260, 69829  All mycotic toenails were reduced and debrided in length, width, and girth using a nail nipper and electric dremel.    All hyperkeratotic skin lesion(s) if present were sharply pared with a #10 scalpel with no evidence of ulceration/abscess.    Patient tolerated procedure(s) well without complications.    Procedures     HPI:  Ernest Markley is a 69 y.o.year old male seen for diabetic foot exam.  The patient has class findings with diabetes.   The patient complained of thick toenails and slightly increasing tingling/type sensations in feet.  The patient denied any acute pedal disorder, redness, acute swelling, or recent injury.  Clinically unchanged from prior visit.  Significant past medical history of kidney transplant    The following portions of the patient's history were reviewed and updated as appropriate: allergies, current medications, past family history, past medical history, past social history, past surgical history and problem list.    REVIEW OF SYSTEMS:  GENERAL: No fever or chills  HEART: No chest pain, or palpitation  RESPIRATORY:  No acute SOB or cough  GI: No Nausea, vomit or diarrhea  NEUROLOGIC: No syncope or acute weakness    PHYSICAL EXAM:    Ht 5' 9.5\" (1.765 m) Comment: stated  Wt (!) 141 kg (310 lb)   BMI 45.12 kg/m²     VASCULAR " EXAM  Posterior tibial artery +1 bilateral  Dorsalis pedis artery absent bilateral  The patient has class findings with skin atrophy, lack of digital hair, and nail dystrophy.    There is +1 lower extremity edema bilaterally.    Venous stasis skin changes noted BLE. No    NEUROLOGIC EXAM  Sensation is intact to light touch. Yes   Sensation is absent to 10gm monofilament.  Vibratory sensation absent.     Tingling numb paresthesias noted bilateral.      No focal neurologic deficit.     DERMATOLOGIC EXAM:   Texture, Tone and Turgor are diminished bilateral.  The patient has dystrophic/hypertrophic toenails with yellow/white discoloration, onycholysis, and subungal debris.   Fungal odor noted.  Brittle nature noted.  Right foot nails severely dystrophic x3 with 0.4 cm ave thickness (1 2 and 3)  Left foot nails dystrophic x3 with 0.5 cm ave thickness (1 2 and 4)  )Patient has hyperkeratotic lesions noted:  Right foot at distal tip of fourth toe.  Left foot at none  Ulcer or cellulitis noted.No  No notable suspicious skin lesions.      MUSCULOSKELETAL EXAM:   No acute joint pain, edema, or redness.  No acute musculoskeletal problem.    Patient has deformity including mild digital contractures. Patient has no ambulation limitations.      Patient wears DM shoes? Yes    Risk Category/Class Findings:  Q8 ( B2 ABC, B3)  1 = Loss of protective sensation    A1)  Has the patient had a previous amputation of the foot or integral skeletal portion thereof? No  B1)  Does the patient have absent posterior tibial pulse? No  B3)  Does the patient have absent dorsalis pedis? Yes  B2)  Does the patient have three of the following? Yes           1.  Hair growth (increased or decreased), 2.  Nail changes (thickening) and 3.  Pigmentary changes (discoloring)  C)  Does the patient have two of the following and one above? No                Diabetic Foot Exam    Patient's shoes and socks removed.    Right Foot/Ankle   Right Foot  Inspection  Skin Exam: skin normal, skin intact, callus (Tip of right fourth toe) and callus (Tip of right fourth toe). No dry skin, no warmth, no erythema, no maceration, no abnormal color, no pre-ulcer and no ulcer.     Toe Exam: swelling (+1).     Sensory   Vibration: absent  Proprioception: intact  Monofilament testing: intact    Vascular  Capillary refills: < 3 seconds  The right DP pulse is 0. The right PT pulse is 1+.     Right Toe  - Comprehensive Exam  Ecchymosis: none  Arch: normal  Hammertoes: second toe, third toe and fourth toe  Claw Toes: absent  Swelling: none   Tenderness: none       Left Foot/Ankle  Left Foot Inspection  Skin Exam: skin normal and skin intact. No dry skin, no warmth, no erythema, no maceration, normal color, no pre-ulcer, no ulcer and no callus.     Toe Exam: swelling (+1).     Sensory   Vibration: absent  Proprioception: intact  Monofilament testing: intact    Vascular  Capillary refills: < 3 seconds  The left DP pulse is 0. The left PT pulse is 1+.     Left Toe  - Comprehensive Exam  Ecchymosis: none  Arch: normal  Hammertoes: second toe, third toe and fourth toe  Claw toes: absent  Swelling: none   Tenderness: none       Assign Risk Category  Deformity present  No loss of protective sensation  Weak pulses  Risk: 1

## 2025-07-23 ENCOUNTER — VBI (OUTPATIENT)
Dept: ADMINISTRATIVE | Facility: OTHER | Age: 70
End: 2025-07-23

## 2025-07-23 NOTE — TELEPHONE ENCOUNTER
07/23/25 10:26 AM     Chart reviewed for Diabetic Eye Exam was/were submitted to the patient's insurance.     Basia Orellana   [facility]

## 2025-07-23 NOTE — TELEPHONE ENCOUNTER
Upon review of the In Basket request we were able to locate, review, and update the patient chart as requested for Diabetic Eye Exam.    Any additional questions or concerns should be emailed to the Practice Liaisons via the appropriate education email address, please do not reply via In Basket.    Thank you  Basia Orellana   [facility]

## 2025-07-25 ENCOUNTER — OFFICE VISIT (OUTPATIENT)
Age: 70
End: 2025-07-25
Payer: MEDICARE

## 2025-07-25 VITALS
HEART RATE: 74 BPM | DIASTOLIC BLOOD PRESSURE: 68 MMHG | HEIGHT: 70 IN | SYSTOLIC BLOOD PRESSURE: 128 MMHG | OXYGEN SATURATION: 97 % | BODY MASS INDEX: 41.69 KG/M2 | WEIGHT: 291.2 LBS

## 2025-07-25 DIAGNOSIS — N18.6 END STAGE RENAL DISEASE (HCC): ICD-10-CM

## 2025-07-25 DIAGNOSIS — I10 BENIGN ESSENTIAL HYPERTENSION: ICD-10-CM

## 2025-07-25 DIAGNOSIS — E66.01 MORBID OBESITY WITH BMI OF 40.0-44.9, ADULT (HCC): ICD-10-CM

## 2025-07-25 DIAGNOSIS — E78.2 MIXED HYPERLIPIDEMIA: ICD-10-CM

## 2025-07-25 DIAGNOSIS — E04.1 THYROID NODULE: ICD-10-CM

## 2025-07-25 DIAGNOSIS — E11.21 TYPE 2 DIABETES MELLITUS WITH DIABETIC NEPHROPATHY, WITHOUT LONG-TERM CURRENT USE OF INSULIN (HCC): Primary | ICD-10-CM

## 2025-07-25 DIAGNOSIS — E11.22 STAGE 3 CHRONIC KIDNEY DISEASE DUE TO TYPE 2 DIABETES MELLITUS (HCC): ICD-10-CM

## 2025-07-25 DIAGNOSIS — N18.30 STAGE 3 CHRONIC KIDNEY DISEASE DUE TO TYPE 2 DIABETES MELLITUS (HCC): ICD-10-CM

## 2025-07-25 PROBLEM — I50.33 ACUTE ON CHRONIC HEART FAILURE WITH PRESERVED EJECTION FRACTION (HFPEF) (HCC): Status: ACTIVE | Noted: 2025-07-25

## 2025-07-25 PROCEDURE — G2211 COMPLEX E/M VISIT ADD ON: HCPCS | Performed by: NURSE PRACTITIONER

## 2025-07-25 PROCEDURE — 95251 CONT GLUC MNTR ANALYSIS I&R: CPT | Performed by: NURSE PRACTITIONER

## 2025-07-25 PROCEDURE — 99214 OFFICE O/P EST MOD 30 MIN: CPT | Performed by: NURSE PRACTITIONER

## 2025-07-28 ENCOUNTER — TELEPHONE (OUTPATIENT)
Age: 70
End: 2025-07-28

## 2025-08-01 DIAGNOSIS — E11.21 TYPE 2 DIABETES MELLITUS WITH DIABETIC NEPHROPATHY, WITHOUT LONG-TERM CURRENT USE OF INSULIN (HCC): Primary | ICD-10-CM

## 2025-08-04 DIAGNOSIS — E78.2 MIXED HYPERLIPIDEMIA: ICD-10-CM

## 2025-08-04 DIAGNOSIS — E78.2 MIXED HYPERLIPIDEMIA: Primary | ICD-10-CM

## 2025-08-04 RX ORDER — ROSUVASTATIN CALCIUM 40 MG/1
40 TABLET, COATED ORAL DAILY
Qty: 90 TABLET | Refills: 1 | Status: SHIPPED | OUTPATIENT
Start: 2025-08-04 | End: 2025-08-04 | Stop reason: SDUPTHER

## 2025-08-04 RX ORDER — ROSUVASTATIN CALCIUM 40 MG/1
40 TABLET, COATED ORAL DAILY
Qty: 90 TABLET | Refills: 1 | Status: SHIPPED | OUTPATIENT
Start: 2025-08-04

## 2025-08-05 ENCOUNTER — NURSE TRIAGE (OUTPATIENT)
Age: 70
End: 2025-08-05

## 2025-08-05 ENCOUNTER — TELEPHONE (OUTPATIENT)
Age: 70
End: 2025-08-05

## 2025-08-05 DIAGNOSIS — E11.9 TYPE 2 DIABETES MELLITUS WITHOUT COMPLICATION, WITHOUT LONG-TERM CURRENT USE OF INSULIN (HCC): ICD-10-CM

## 2025-08-05 DIAGNOSIS — I48.91 ATRIAL FIBRILLATION, UNSPECIFIED TYPE (HCC): Primary | ICD-10-CM

## 2025-08-05 DIAGNOSIS — E11.9 TYPE 2 DIABETES MELLITUS WITHOUT COMPLICATION, WITHOUT LONG-TERM CURRENT USE OF INSULIN (HCC): Primary | ICD-10-CM

## 2025-08-05 RX ORDER — PEN NEEDLE, DIABETIC 32GX 5/32"
NEEDLE, DISPOSABLE MISCELLANEOUS 4 TIMES DAILY
Qty: 150 EACH | Refills: 1 | Status: SHIPPED | OUTPATIENT
Start: 2025-08-05 | End: 2025-08-05 | Stop reason: CLARIF

## 2025-08-05 RX ORDER — PEN NEEDLE, DIABETIC 32GX 5/32"
NEEDLE, DISPOSABLE MISCELLANEOUS 4 TIMES DAILY
Qty: 150 EACH | Refills: 1 | Status: SHIPPED | OUTPATIENT
Start: 2025-08-05

## 2025-08-05 RX ORDER — ICOSAPENT ETHYL 1 G/1
2 CAPSULE ORAL 2 TIMES DAILY
Qty: 360 CAPSULE | Refills: 3 | Status: SHIPPED | OUTPATIENT
Start: 2025-08-05

## 2025-08-08 ENCOUNTER — OFFICE VISIT (OUTPATIENT)
Dept: PODIATRY | Facility: CLINIC | Age: 70
End: 2025-08-08
Payer: MEDICARE

## 2025-08-08 VITALS — WEIGHT: 292 LBS | HEIGHT: 70 IN | BODY MASS INDEX: 41.8 KG/M2

## 2025-08-08 DIAGNOSIS — L84 CORNS AND CALLUS: ICD-10-CM

## 2025-08-08 DIAGNOSIS — E11.42 DIABETIC POLYNEUROPATHY ASSOCIATED WITH TYPE 2 DIABETES MELLITUS (HCC): ICD-10-CM

## 2025-08-08 DIAGNOSIS — B35.1 ONYCHOMYCOSIS: Primary | ICD-10-CM

## 2025-08-08 PROCEDURE — 11721 DEBRIDE NAIL 6 OR MORE: CPT | Performed by: PODIATRIST

## 2025-08-08 PROCEDURE — RECHECK: Performed by: PODIATRIST

## 2025-08-08 PROCEDURE — 11055 PARING/CUTG B9 HYPRKER LES 1: CPT | Performed by: PODIATRIST
